# Patient Record
Sex: MALE | Race: WHITE | NOT HISPANIC OR LATINO
[De-identification: names, ages, dates, MRNs, and addresses within clinical notes are randomized per-mention and may not be internally consistent; named-entity substitution may affect disease eponyms.]

---

## 2017-01-24 ENCOUNTER — APPOINTMENT (OUTPATIENT)
Dept: ELECTROPHYSIOLOGY | Facility: CLINIC | Age: 67
End: 2017-01-24

## 2017-01-30 ENCOUNTER — APPOINTMENT (OUTPATIENT)
Dept: ELECTROPHYSIOLOGY | Facility: CLINIC | Age: 67
End: 2017-01-30

## 2017-03-03 ENCOUNTER — APPOINTMENT (OUTPATIENT)
Dept: ELECTROPHYSIOLOGY | Facility: CLINIC | Age: 67
End: 2017-03-03

## 2017-04-03 ENCOUNTER — APPOINTMENT (OUTPATIENT)
Dept: ELECTROPHYSIOLOGY | Facility: CLINIC | Age: 67
End: 2017-04-03

## 2017-05-05 ENCOUNTER — APPOINTMENT (OUTPATIENT)
Dept: ELECTROPHYSIOLOGY | Facility: CLINIC | Age: 67
End: 2017-05-05

## 2017-06-27 ENCOUNTER — APPOINTMENT (OUTPATIENT)
Dept: GASTROENTEROLOGY | Facility: CLINIC | Age: 67
End: 2017-06-27

## 2017-06-27 VITALS
DIASTOLIC BLOOD PRESSURE: 60 MMHG | HEART RATE: 65 BPM | HEIGHT: 71 IN | WEIGHT: 285 LBS | SYSTOLIC BLOOD PRESSURE: 107 MMHG | BODY MASS INDEX: 39.9 KG/M2 | OXYGEN SATURATION: 98 %

## 2017-06-27 DIAGNOSIS — Z80.0 FAMILY HISTORY OF MALIGNANT NEOPLASM OF DIGESTIVE ORGANS: ICD-10-CM

## 2017-06-27 DIAGNOSIS — K57.93 DIVERTICULITIS OF INTESTINE, PART UNSPECIFIED, W/OUT PERFORATION OR ABSCESS WITH BLEEDING: ICD-10-CM

## 2017-06-27 DIAGNOSIS — K57.92 DIVERTICULITIS OF INTESTINE, PART UNSPECIFIED, W/OUT PERFORATION OR ABSCESS W/OUT BLEEDING: ICD-10-CM

## 2017-06-27 RX ORDER — MOXIFLOXACIN HYDROCHLORIDE TABLETS, 400 MG 400 MG/1
400 TABLET, FILM COATED ORAL DAILY
Qty: 10 | Refills: 0 | Status: ACTIVE | COMMUNITY
Start: 2017-06-27 | End: 1900-01-01

## 2017-06-27 RX ORDER — ATORVASTATIN CALCIUM 80 MG/1
80 TABLET, FILM COATED ORAL
Qty: 30 | Refills: 0 | Status: ACTIVE | COMMUNITY
Start: 2017-06-06

## 2017-06-27 RX ORDER — VALSARTAN 40 MG/1
40 TABLET, COATED ORAL
Qty: 60 | Refills: 0 | Status: ACTIVE | COMMUNITY
Start: 2017-06-22

## 2017-06-27 RX ORDER — SERTRALINE HYDROCHLORIDE 100 MG/1
100 TABLET, FILM COATED ORAL
Qty: 180 | Refills: 0 | Status: ACTIVE | COMMUNITY
Start: 2017-06-22

## 2018-02-06 ENCOUNTER — RX RENEWAL (OUTPATIENT)
Age: 68
End: 2018-02-06

## 2018-02-06 RX ORDER — SODIUM SULFATE, POTASSIUM SULFATE, MAGNESIUM SULFATE 17.5; 3.13; 1.6 G/ML; G/ML; G/ML
17.5-3.13-1.6 SOLUTION, CONCENTRATE ORAL
Qty: 1 | Refills: 0 | Status: ACTIVE | COMMUNITY
Start: 2018-02-06 | End: 1900-01-01

## 2018-03-13 ENCOUNTER — APPOINTMENT (OUTPATIENT)
Dept: GASTROENTEROLOGY | Facility: AMBULATORY MEDICAL SERVICES | Age: 68
End: 2018-03-13
Payer: MEDICARE

## 2018-03-13 PROCEDURE — 45380 COLONOSCOPY AND BIOPSY: CPT

## 2018-07-27 PROBLEM — Z80.0 FAMILY HISTORY OF COLON CANCER: Status: ACTIVE | Noted: 2017-06-27

## 2018-12-02 ENCOUNTER — TRANSCRIPTION ENCOUNTER (OUTPATIENT)
Age: 68
End: 2018-12-02

## 2020-02-28 ENCOUNTER — INPATIENT (INPATIENT)
Facility: HOSPITAL | Age: 70
LOS: 2 days | Discharge: HOME | DRG: 696 | End: 2020-03-02
Attending: INTERNAL MEDICINE | Admitting: INTERNAL MEDICINE
Payer: MEDICARE

## 2020-02-28 VITALS
SYSTOLIC BLOOD PRESSURE: 114 MMHG | HEART RATE: 64 BPM | OXYGEN SATURATION: 99 % | TEMPERATURE: 98 F | HEIGHT: 71 IN | WEIGHT: 279.99 LBS | DIASTOLIC BLOOD PRESSURE: 71 MMHG | RESPIRATION RATE: 18 BRPM

## 2020-02-28 LAB
ALBUMIN SERPL ELPH-MCNC: 3.8 G/DL — SIGNIFICANT CHANGE UP (ref 3.3–5)
ALP SERPL-CCNC: 107 U/L — SIGNIFICANT CHANGE UP (ref 30–120)
ALT FLD-CCNC: 30 U/L DA — SIGNIFICANT CHANGE UP (ref 10–60)
ANION GAP SERPL CALC-SCNC: 8 MMOL/L — SIGNIFICANT CHANGE UP (ref 5–17)
APTT BLD: 33.9 SEC — SIGNIFICANT CHANGE UP (ref 28.5–37)
AST SERPL-CCNC: 21 U/L — SIGNIFICANT CHANGE UP (ref 10–40)
BASOPHILS # BLD AUTO: 0.04 K/UL — SIGNIFICANT CHANGE UP (ref 0–0.2)
BASOPHILS NFR BLD AUTO: 0.4 % — SIGNIFICANT CHANGE UP (ref 0–2)
BILIRUB SERPL-MCNC: 0.6 MG/DL — SIGNIFICANT CHANGE UP (ref 0.2–1.2)
BUN SERPL-MCNC: 19 MG/DL — SIGNIFICANT CHANGE UP (ref 7–23)
CALCIUM SERPL-MCNC: 8.8 MG/DL — SIGNIFICANT CHANGE UP (ref 8.4–10.5)
CHLORIDE SERPL-SCNC: 105 MMOL/L — SIGNIFICANT CHANGE UP (ref 96–108)
CO2 SERPL-SCNC: 27 MMOL/L — SIGNIFICANT CHANGE UP (ref 22–31)
CREAT SERPL-MCNC: 1.21 MG/DL — SIGNIFICANT CHANGE UP (ref 0.5–1.3)
EOSINOPHIL # BLD AUTO: 0.05 K/UL — SIGNIFICANT CHANGE UP (ref 0–0.5)
EOSINOPHIL NFR BLD AUTO: 0.4 % — SIGNIFICANT CHANGE UP (ref 0–6)
GLUCOSE SERPL-MCNC: 120 MG/DL — HIGH (ref 70–99)
HCT VFR BLD CALC: 44 % — SIGNIFICANT CHANGE UP (ref 39–50)
HGB BLD-MCNC: 14.9 G/DL — SIGNIFICANT CHANGE UP (ref 13–17)
IMM GRANULOCYTES NFR BLD AUTO: 0.4 % — SIGNIFICANT CHANGE UP (ref 0–1.5)
INR BLD: 1.35 RATIO — HIGH (ref 0.88–1.16)
LYMPHOCYTES # BLD AUTO: 1.41 K/UL — SIGNIFICANT CHANGE UP (ref 1–3.3)
LYMPHOCYTES # BLD AUTO: 12.6 % — LOW (ref 13–44)
MCHC RBC-ENTMCNC: 30.8 PG — SIGNIFICANT CHANGE UP (ref 27–34)
MCHC RBC-ENTMCNC: 33.9 GM/DL — SIGNIFICANT CHANGE UP (ref 32–36)
MCV RBC AUTO: 91.1 FL — SIGNIFICANT CHANGE UP (ref 80–100)
MONOCYTES # BLD AUTO: 0.86 K/UL — SIGNIFICANT CHANGE UP (ref 0–0.9)
MONOCYTES NFR BLD AUTO: 7.7 % — SIGNIFICANT CHANGE UP (ref 2–14)
NEUTROPHILS # BLD AUTO: 8.75 K/UL — HIGH (ref 1.8–7.4)
NEUTROPHILS NFR BLD AUTO: 78.5 % — HIGH (ref 43–77)
NRBC # BLD: 0 /100 WBCS — SIGNIFICANT CHANGE UP (ref 0–0)
PLATELET # BLD AUTO: 186 K/UL — SIGNIFICANT CHANGE UP (ref 150–400)
POTASSIUM SERPL-MCNC: 4.4 MMOL/L — SIGNIFICANT CHANGE UP (ref 3.5–5.3)
POTASSIUM SERPL-SCNC: 4.4 MMOL/L — SIGNIFICANT CHANGE UP (ref 3.5–5.3)
PROT SERPL-MCNC: 7.9 G/DL — SIGNIFICANT CHANGE UP (ref 6–8.3)
PROTHROM AB SERPL-ACNC: 15.1 SEC — HIGH (ref 10–12.9)
RBC # BLD: 4.83 M/UL — SIGNIFICANT CHANGE UP (ref 4.2–5.8)
RBC # FLD: 13.2 % — SIGNIFICANT CHANGE UP (ref 10.3–14.5)
SODIUM SERPL-SCNC: 140 MMOL/L — SIGNIFICANT CHANGE UP (ref 135–145)
WBC # BLD: 11.15 K/UL — HIGH (ref 3.8–10.5)
WBC # FLD AUTO: 11.15 K/UL — HIGH (ref 3.8–10.5)

## 2020-02-28 RX ORDER — SODIUM CHLORIDE 9 MG/ML
1000 INJECTION INTRAMUSCULAR; INTRAVENOUS; SUBCUTANEOUS ONCE
Refills: 0 | Status: COMPLETED | OUTPATIENT
Start: 2020-02-28 | End: 2020-02-28

## 2020-02-28 RX ORDER — OXYCODONE HYDROCHLORIDE 5 MG/1
5 TABLET ORAL
Refills: 0 | Status: DISCONTINUED | OUTPATIENT
Start: 2020-02-28 | End: 2020-03-02

## 2020-02-28 RX ORDER — TAMSULOSIN HYDROCHLORIDE 0.4 MG/1
0.8 CAPSULE ORAL AT BEDTIME
Refills: 0 | Status: DISCONTINUED | OUTPATIENT
Start: 2020-02-28 | End: 2020-03-02

## 2020-02-28 RX ORDER — HYDROMORPHONE HYDROCHLORIDE 2 MG/ML
1 INJECTION INTRAMUSCULAR; INTRAVENOUS; SUBCUTANEOUS ONCE
Refills: 0 | Status: DISCONTINUED | OUTPATIENT
Start: 2020-02-28 | End: 2020-02-28

## 2020-02-28 RX ORDER — TRAMADOL HYDROCHLORIDE 50 MG/1
25 TABLET ORAL
Refills: 0 | Status: DISCONTINUED | OUTPATIENT
Start: 2020-02-28 | End: 2020-03-02

## 2020-02-28 RX ORDER — METOPROLOL TARTRATE 50 MG
100 TABLET ORAL DAILY
Refills: 0 | Status: DISCONTINUED | OUTPATIENT
Start: 2020-02-28 | End: 2020-03-02

## 2020-02-28 RX ORDER — ACETAMINOPHEN 500 MG
650 TABLET ORAL EVERY 6 HOURS
Refills: 0 | Status: DISCONTINUED | OUTPATIENT
Start: 2020-02-28 | End: 2020-03-02

## 2020-02-28 RX ORDER — ONDANSETRON 8 MG/1
4 TABLET, FILM COATED ORAL ONCE
Refills: 0 | Status: COMPLETED | OUTPATIENT
Start: 2020-02-28 | End: 2020-02-28

## 2020-02-28 RX ORDER — MORPHINE SULFATE 50 MG/1
4 CAPSULE, EXTENDED RELEASE ORAL ONCE
Refills: 0 | Status: DISCONTINUED | OUTPATIENT
Start: 2020-02-28 | End: 2020-02-28

## 2020-02-28 RX ORDER — FINASTERIDE 5 MG/1
5 TABLET, FILM COATED ORAL DAILY
Refills: 0 | Status: DISCONTINUED | OUTPATIENT
Start: 2020-02-28 | End: 2020-03-02

## 2020-02-28 RX ORDER — ATORVASTATIN CALCIUM 80 MG/1
80 TABLET, FILM COATED ORAL AT BEDTIME
Refills: 0 | Status: DISCONTINUED | OUTPATIENT
Start: 2020-02-28 | End: 2020-03-02

## 2020-02-28 RX ADMIN — MORPHINE SULFATE 4 MILLIGRAM(S): 50 CAPSULE, EXTENDED RELEASE ORAL at 22:19

## 2020-02-28 RX ADMIN — HYDROMORPHONE HYDROCHLORIDE 1 MILLIGRAM(S): 2 INJECTION INTRAMUSCULAR; INTRAVENOUS; SUBCUTANEOUS at 23:35

## 2020-02-28 RX ADMIN — SODIUM CHLORIDE 1000 MILLILITER(S): 9 INJECTION INTRAMUSCULAR; INTRAVENOUS; SUBCUTANEOUS at 22:19

## 2020-02-28 RX ADMIN — MORPHINE SULFATE 4 MILLIGRAM(S): 50 CAPSULE, EXTENDED RELEASE ORAL at 22:40

## 2020-02-28 RX ADMIN — ONDANSETRON 4 MILLIGRAM(S): 8 TABLET, FILM COATED ORAL at 23:35

## 2020-02-28 RX ADMIN — HYDROMORPHONE HYDROCHLORIDE 1 MILLIGRAM(S): 2 INJECTION INTRAMUSCULAR; INTRAVENOUS; SUBCUTANEOUS at 23:34

## 2020-02-28 NOTE — ED PROVIDER NOTE - CONSTITUTIONAL, MLM
normal... Well appearing, awake, alert, oriented to person, place, time/situation and appears uncomfortable.

## 2020-02-28 NOTE — ED PROVIDER NOTE - CARDIAC, MLM
Normal rate, irregular rhythm.  Heart sounds S1, S2.  No murmurs, rubs or gallops. cap refill <2 seconds

## 2020-02-28 NOTE — ED ADULT NURSE NOTE - OBJECTIVE STATEMENT
c/o hematuria, hx of atrial fibrillation and patient is on Xarelto, hx of turf x 2, Pt is in no acute distress but c/o bad spasm. c/o hematuria, hx of atrial fibrillation and patient is on blood thinner, hx of turf x 2, Pt is in no acute distress but c/o bad spasm. Pt is in no acute distress. patient appears anxious and uncomfortable, standing, wife at bedside, will continue to monitor.

## 2020-02-28 NOTE — ED PROVIDER NOTE - PROGRESS NOTE DETAILS
pt irrigated by 2 way catheter to pink with no clots.  will replace catheter with 3 way  catheter and admit for cbi and uology consult in am pt irrigated ,  three way replaced and cbi started requiring intermittent irrigation

## 2020-02-28 NOTE — ED PROVIDER NOTE - NOTES
d/w dr ozuna, suggests trying [address] two way for hand irrigation then when clearer, changing back for c/b/i

## 2020-02-28 NOTE — ED PROVIDER NOTE - OBJECTIVE STATEMENT
70 y/o male with a PMhx of CAD s/p stents, HLD, BPH presents to the ED c/o hematuria and urinary retention today. Pt states he has had hematuria in past with clots. Hx of turp procedure x2. On Eliquis for afib. Denies dizziness, SOB. 70 y/o male with a PMhx of CAD s/p stents, HLD, BPH presents to the ED c/o hematuria and urinary retention today. Pt states he has had hematuria in past with clots. Hx of turp procedure x2. On Eliquis for afib. Denies dizziness, SOB.    Pmd: deyanira patrick  uro: jacob at [facility]; dheeraj

## 2020-02-28 NOTE — ED PROVIDER NOTE - CLINICAL SUMMARY MEDICAL DECISION MAKING FREE TEXT BOX
70 y/o male hx of multiple prostate procedures on eliquis for afib p/w hematuria and clot retention ,check CBC, type and screen, coags, initiate cbi, re-eval. 70 y/o male hx of multiple prostate procedures on eliquis for afib p/w hematuria and clot retention ,check CBC, type and screen, coags, initiate cbi, re-eval.--admit cbi,  urol consult in am

## 2020-02-29 DIAGNOSIS — R31.9 HEMATURIA, UNSPECIFIED: ICD-10-CM

## 2020-02-29 DIAGNOSIS — I48.0 PAROXYSMAL ATRIAL FIBRILLATION: ICD-10-CM

## 2020-02-29 DIAGNOSIS — I25.10 ATHEROSCLEROTIC HEART DISEASE OF NATIVE CORONARY ARTERY WITHOUT ANGINA PECTORIS: ICD-10-CM

## 2020-02-29 DIAGNOSIS — N40.1 BENIGN PROSTATIC HYPERPLASIA WITH LOWER URINARY TRACT SYMPTOMS: ICD-10-CM

## 2020-02-29 DIAGNOSIS — Z29.9 ENCOUNTER FOR PROPHYLACTIC MEASURES, UNSPECIFIED: ICD-10-CM

## 2020-02-29 DIAGNOSIS — R31.0 GROSS HEMATURIA: ICD-10-CM

## 2020-02-29 DIAGNOSIS — E78.5 HYPERLIPIDEMIA, UNSPECIFIED: ICD-10-CM

## 2020-02-29 LAB
APPEARANCE UR: ABNORMAL
BILIRUB UR-MCNC: NEGATIVE — SIGNIFICANT CHANGE UP
BLD GP AB SCN SERPL QL: SIGNIFICANT CHANGE UP
COLOR SPEC: ABNORMAL
DIFF PNL FLD: ABNORMAL
GLUCOSE UR QL: NEGATIVE MG/DL — SIGNIFICANT CHANGE UP
HBA1C BLD-MCNC: 5.2 % — SIGNIFICANT CHANGE UP (ref 4–5.6)
HCT VFR BLD CALC: 39.6 % — SIGNIFICANT CHANGE UP (ref 39–50)
HGB BLD-MCNC: 13.8 G/DL — SIGNIFICANT CHANGE UP (ref 13–17)
KETONES UR-MCNC: NEGATIVE — SIGNIFICANT CHANGE UP
LEUKOCYTE ESTERASE UR-ACNC: ABNORMAL
MCHC RBC-ENTMCNC: 31.2 PG — SIGNIFICANT CHANGE UP (ref 27–34)
MCHC RBC-ENTMCNC: 34.8 GM/DL — SIGNIFICANT CHANGE UP (ref 32–36)
MCV RBC AUTO: 89.4 FL — SIGNIFICANT CHANGE UP (ref 80–100)
NITRITE UR-MCNC: NEGATIVE — SIGNIFICANT CHANGE UP
NRBC # BLD: 0 /100 WBCS — SIGNIFICANT CHANGE UP (ref 0–0)
PH UR: 5 — SIGNIFICANT CHANGE UP (ref 5–8)
PLATELET # BLD AUTO: 183 K/UL — SIGNIFICANT CHANGE UP (ref 150–400)
PROT UR-MCNC: 100 MG/DL
RBC # BLD: 4.43 M/UL — SIGNIFICANT CHANGE UP (ref 4.2–5.8)
RBC # FLD: 13.2 % — SIGNIFICANT CHANGE UP (ref 10.3–14.5)
RBC CASTS # UR COMP ASSIST: >50 /HPF (ref 0–4)
SP GR SPEC: 1 — LOW (ref 1.01–1.02)
UROBILINOGEN FLD QL: NEGATIVE MG/DL — SIGNIFICANT CHANGE UP
WBC # BLD: 11.56 K/UL — HIGH (ref 3.8–10.5)
WBC # FLD AUTO: 11.56 K/UL — HIGH (ref 3.8–10.5)
WBC UR QL: SIGNIFICANT CHANGE UP

## 2020-02-29 PROCEDURE — 99285 EMERGENCY DEPT VISIT HI MDM: CPT

## 2020-02-29 RX ORDER — SERTRALINE 25 MG/1
200 TABLET, FILM COATED ORAL DAILY
Refills: 0 | Status: DISCONTINUED | OUTPATIENT
Start: 2020-02-29 | End: 2020-03-02

## 2020-02-29 RX ORDER — METOPROLOL TARTRATE 50 MG
75 TABLET ORAL
Qty: 0 | Refills: 0 | DISCHARGE

## 2020-02-29 RX ORDER — CEFTRIAXONE 500 MG/1
1000 INJECTION, POWDER, FOR SOLUTION INTRAMUSCULAR; INTRAVENOUS ONCE
Refills: 0 | Status: COMPLETED | OUTPATIENT
Start: 2020-02-29 | End: 2020-02-29

## 2020-02-29 RX ORDER — ALPRAZOLAM 0.25 MG
0.25 TABLET ORAL ONCE
Refills: 0 | Status: DISCONTINUED | OUTPATIENT
Start: 2020-02-29 | End: 2020-02-29

## 2020-02-29 RX ADMIN — Medication 650 MILLIGRAM(S): at 09:50

## 2020-02-29 RX ADMIN — FINASTERIDE 5 MILLIGRAM(S): 5 TABLET, FILM COATED ORAL at 13:02

## 2020-02-29 RX ADMIN — FINASTERIDE 5 MILLIGRAM(S): 5 TABLET, FILM COATED ORAL at 00:25

## 2020-02-29 RX ADMIN — Medication 650 MILLIGRAM(S): at 09:08

## 2020-02-29 RX ADMIN — CEFTRIAXONE 100 MILLIGRAM(S): 500 INJECTION, POWDER, FOR SOLUTION INTRAMUSCULAR; INTRAVENOUS at 00:16

## 2020-02-29 RX ADMIN — SERTRALINE 200 MILLIGRAM(S): 25 TABLET, FILM COATED ORAL at 17:28

## 2020-02-29 RX ADMIN — Medication 0.25 MILLIGRAM(S): at 23:00

## 2020-02-29 RX ADMIN — ATORVASTATIN CALCIUM 80 MILLIGRAM(S): 80 TABLET, FILM COATED ORAL at 21:35

## 2020-02-29 RX ADMIN — TAMSULOSIN HYDROCHLORIDE 0.8 MILLIGRAM(S): 0.4 CAPSULE ORAL at 00:25

## 2020-02-29 RX ADMIN — ATORVASTATIN CALCIUM 80 MILLIGRAM(S): 80 TABLET, FILM COATED ORAL at 00:25

## 2020-02-29 RX ADMIN — Medication 100 MILLIGRAM(S): at 00:25

## 2020-02-29 RX ADMIN — TAMSULOSIN HYDROCHLORIDE 0.8 MILLIGRAM(S): 0.4 CAPSULE ORAL at 21:36

## 2020-02-29 RX ADMIN — SODIUM CHLORIDE 1000 MILLILITER(S): 9 INJECTION INTRAMUSCULAR; INTRAVENOUS; SUBCUTANEOUS at 00:00

## 2020-02-29 RX ADMIN — CEFTRIAXONE 1000 MILLIGRAM(S): 500 INJECTION, POWDER, FOR SOLUTION INTRAMUSCULAR; INTRAVENOUS at 01:16

## 2020-02-29 NOTE — CONSULT NOTE ADULT - SUBJECTIVE AND OBJECTIVE BOX
CHIEF COMPLAINT:    HPI:      PAST MEDICAL & SURGICAL HISTORY:  Afib  Hyperlipemia  BPH (benign prostatic hyperplasia)  CAD (coronary artery disease)  Stented coronary artery      REVIEW OF SYSTEMS:    CONSTITUTIONAL: No weakness, fevers or chills  EYES/ENT: No visual changes;  No vertigo or throat pain   NECK: No pain or stiffness  RESPIRATORY: No cough, wheezing, hemoptysis; No shortness of breath  CARDIOVASCULAR: No chest pain or palpitations  GASTROINTESTINAL: No abdominal or epigastric pain. No nausea, vomiting, or hematemesis; No diarrhea or constipation. No melena or hematochezia.  GENITOURINARY: No dysuria, frequency or hematuria  NEUROLOGICAL: No numbness or weakness  SKIN: No itching, burning, rashes, or lesions   All other review of systems is negative unless indicated above.    MEDICATIONS  (STANDING):  atorvastatin 80 milliGRAM(s) Oral at bedtime  finasteride 5 milliGRAM(s) Oral daily  metoprolol succinate  milliGRAM(s) Oral daily  tamsulosin 0.8 milliGRAM(s) Oral at bedtime    MEDICATIONS  (PRN):  acetaminophen   Tablet .. 650 milliGRAM(s) Oral every 6 hours PRN Mild Pain (1 - 3)  oxyCODONE    IR 5 milliGRAM(s) Oral four times a day PRN Severe Pain (7 - 10)  traMADol 25 milliGRAM(s) Oral four times a day PRN Moderate Pain (4 - 6)      Allergies    penicillin (Rash)    Intolerances        Social History:  Alcohol: Denied  Smoking: Nonsmoker  Drug Use: Denied  Marital Status:     FAMILY HISTORY:  No pertinent family history in first degree relatives      Vital Signs Last 24 Hrs  T(C): 36.5 (29 Feb 2020 09:30), Max: 36.7 (29 Feb 2020 00:39)  T(F): 97.7 (29 Feb 2020 09:30), Max: 98.1 (29 Feb 2020 04:19)  HR: 73 (29 Feb 2020 09:30) (61 - 78)  BP: 123/80 (29 Feb 2020 09:30) (101/61 - 148/84)  BP(mean): --  RR: 16 (29 Feb 2020 09:30) (16 - 20)  SpO2: 96% (29 Feb 2020 09:30) (95% - 99%)    PHYSICAL EXAM:    Constitutional: NAD, well-developed  HEENT: LATOYA, EOMI, Normal Hearing, MMM  Neck: No LAD, No JVD  Back: Normal spine flexure, No CVA tenderness  Respiratory: CTAB   Cardiovascular: S1 and S2, RRR, no M/G/R  Abd: BS+, soft, NT/ND, No CVAT  : Normal phallus,open meatus,bilateral descended testes, no masses  ZEN: Normal prostate, no masses  Extremities: No peripheral edema  Vascular: 2+ peripheral pulses  Neurological: A/O x 3, no focal deficits  Psychiatric: Normal mood, normal affect  Musculoskeletal: 5/5 strength b/l upper and lower extremities  Skin: No rashes    LABS:                        13.8   11.56 )-----------( 183      ( 29 Feb 2020 00:32 )             39.6     02-28    140  |  105  |  19  ----------------------------<  120<H>  4.4   |  27  |  1.21    Ca    8.8      28 Feb 2020 21:45    TPro  7.9  /  Alb  3.8  /  TBili  0.6  /  DBili  x   /  AST  21  /  ALT  30  /  AlkPhos  107  02-28    PT/INR - ( 28 Feb 2020 21:45 )   PT: 15.1 sec;   INR: 1.35 ratio         PTT - ( 28 Feb 2020 21:45 )  PTT:33.9 sec  Urinalysis Basic - ( 29 Feb 2020 00:35 )    Color: x / Appearance: x / SG: x / pH: x  Gluc: x / Ketone: x  / Bili: x / Urobili: x   Blood: x / Protein: x / Nitrite: x   Leuk Esterase: x / RBC: >50 /HPF / WBC 3-5   Sq Epi: x / Non Sq Epi: x / Bacteria: x      Urine Culture:   Blood Cultures      RADIOLOGY & ADDITIONAL STUDIES: CHIEF COMPLAINT: TGH/UR    HPI: 69-year-old male with past medical history of hypertension, CAD, status post stent placement, atrial fibrillation, status post ablation, hyperlipidemia, BPH, status post GL PVP x 2 - '13 & '14, who came to the emergency room complaining of Blood in the urine, passing clots and urinary retention.  Patient apparently had similar episodes in the past which required continuous bladder irrigation.  Patient complain of some lower abdominal discomfort and spasm.  Patient in the emergency room had Lainez catheter placed and his clots were flushed.  He was placed on CBI and is admitted to the hospital for further management. This morning urine light domenic w/ mod CBI.      PAST MEDICAL & SURGICAL HISTORY:  Afib  Hyperlipemia  BPH (benign prostatic hyperplasia)  CAD (coronary artery disease)  Stented coronary artery      REVIEW OF SYSTEMS:    CONSTITUTIONAL: No weakness, fevers or chills  EYES/ENT: No visual changes;  No vertigo or throat pain   NECK: No pain or stiffness  RESPIRATORY: No cough, wheezing, hemoptysis; No shortness of breath  CARDIOVASCULAR: No chest pain or palpitations  GASTROINTESTINAL: No abdominal or epigastric pain. No nausea, vomiting, or hematemesis; No diarrhea or constipation. No melena or hematochezia.  GENITOURINARY: No dysuria, frequency   NEUROLOGICAL: No numbness or weakness  SKIN: No itching, burning, rashes, or lesions   All other review of systems is negative unless indicated above.    MEDICATIONS  (STANDING):  atorvastatin 80 milliGRAM(s) Oral at bedtime  finasteride 5 milliGRAM(s) Oral daily  metoprolol succinate  milliGRAM(s) Oral daily  tamsulosin 0.8 milliGRAM(s) Oral at bedtime    MEDICATIONS  (PRN):  acetaminophen   Tablet .. 650 milliGRAM(s) Oral every 6 hours PRN Mild Pain (1 - 3)  oxyCODONE    IR 5 milliGRAM(s) Oral four times a day PRN Severe Pain (7 - 10)  traMADol 25 milliGRAM(s) Oral four times a day PRN Moderate Pain (4 - 6)      Allergies    penicillin (Rash)    Intolerances        Social History:  Alcohol: Denied  Smoking: Nonsmoker  Drug Use: Denied  Marital Status:     FAMILY HISTORY:  No pertinent family history in first degree relatives      Vital Signs Last 24 Hrs  T(C): 36.5 (29 Feb 2020 09:30), Max: 36.7 (29 Feb 2020 00:39)  T(F): 97.7 (29 Feb 2020 09:30), Max: 98.1 (29 Feb 2020 04:19)  HR: 73 (29 Feb 2020 09:30) (61 - 78)  BP: 123/80 (29 Feb 2020 09:30) (101/61 - 148/84)  BP(mean): --  RR: 16 (29 Feb 2020 09:30) (16 - 20)  SpO2: 96% (29 Feb 2020 09:30) (95% - 99%)    PHYSICAL EXAM:    Constitutional: NAD, well-developed  HEENT: LATOYA, EOMI, Normal Hearing  Neck: No LAD  Back: Normal spine flexure, No CVA tenderness  Respiratory: Nl resp effort  Cardiovascular: RRR  Abd: BS+, soft, NT/ND  : Normal phallus,open meatus,bilateral descended testes, no masses  Lainez 3 way - light domenic w/ mod cbi, few small clots  Extremities: No peripheral edema  Vascular: 2+ peripheral pulses  Neurological: A/O x 3, no focal deficits  Psychiatric: Normal mood, normal affect  Musculoskeletal: 5/5 strength b/l upper and lower extremities  Skin: No rashes    LABS:                        13.8   11.56 )-----------( 183      ( 29 Feb 2020 00:32 )             39.6     02-28    140  |  105  |  19  ----------------------------<  120<H>  4.4   |  27  |  1.21    Ca    8.8      28 Feb 2020 21:45    TPro  7.9  /  Alb  3.8  /  TBili  0.6  /  DBili  x   /  AST  21  /  ALT  30  /  AlkPhos  107  02-28    PT/INR - ( 28 Feb 2020 21:45 )   PT: 15.1 sec;   INR: 1.35 ratio         PTT - ( 28 Feb 2020 21:45 )  PTT:33.9 sec  Urinalysis Basic - ( 29 Feb 2020 00:35 )    Color: x / Appearance: x / SG: x / pH: x  Gluc: x / Ketone: x  / Bili: x / Urobili: x   Blood: x / Protein: x / Nitrite: x   Leuk Esterase: x / RBC: >50 /HPF / WBC 3-5   Sq Epi: x / Non Sq Epi: x / Bacteria: x      Urine Culture:   Blood Cultures      RADIOLOGY & ADDITIONAL STUDIES:

## 2020-02-29 NOTE — H&P ADULT - NSHPREVIEWOFSYSTEMS_GEN_ALL_CORE
REVIEW OF SYSTEMS:  CONSTITUTIONAL: No fever, weight loss, or fatigue  EYES: No eye pain, or discharge  ENMT: No sinus or throat pain  NECK: No pain or stiffness  BREASTS: No pain, masses, or nipple discharge  RESPIRATORY: No cough, wheezing, chills or hemoptysis; No shortness of breath  CARDIOVASCULAR: No chest pain, palpitations, dizziness, or leg swelling  GASTROINTESTINAL: No nausea, vomiting. + +lower abdominal pain.   GENITOURINARY: + hematuria  NEUROLOGICAL: No loss of strength, numbness, or tremors  SKIN: No itching, burning, rashes, or lesions   LYMPH NODES: No enlarged glands  ENDOCRINE: No polydipsia or polyuria  MUSCULOSKELETAL: No muscle, back, or extremity pain  PSYCHIATRIC: No depression, anxiety, mood swings.  HEME/LYMPH: No easy bruising, or bleeding gums  ALLERGY AND IMMUNOLOGIC: No hives or eczema

## 2020-02-29 NOTE — H&P ADULT - NSHPLABSRESULTS_GEN_ALL_CORE
13.8   11.56 )-----------( 183      ( 29 Feb 2020 00:32 )             39.6     28 Feb 2020 21:45    140    |  105    |  19     ----------------------------<  120    4.4     |  27     |  1.21     Ca    8.8        28 Feb 2020 21:45    TPro  7.9    /  Alb  3.8    /  TBili  0.6    /  DBili  x      /  AST  21     /  ALT  30     /  AlkPhos  107    28 Feb 2020 21:45    PT/INR - ( 28 Feb 2020 21:45 )   PT: 15.1 sec;   INR: 1.35 ratio         PTT - ( 28 Feb 2020 21:45 )  PTT:33.9 sec

## 2020-02-29 NOTE — H&P ADULT - NSHPSOCIALHISTORY_GEN_ALL_CORE
Social History:    Marital Status:   Occupation:   Lives with:     Substance Use :  Tobacco Usage:  (   ) never smoked   (   ) former smoker   (   ) current smoker  (     ) pack year  (        ) last tobacco use date  Alcohol Usage:    (     ) Advanced Directives: (     ) declined   [  ] health care proxy Social History:    Marital Status:   Lives with: spouse    Substance Use : Denies  Tobacco Usage:  (X) never smoked   (   ) former smoker   (   ) current smoker  (     ) pack year  (        ) last tobacco use date  Alcohol Usage: Denies    (X) Advanced Directives: (X) declined   [  ] health care proxy Yes

## 2020-02-29 NOTE — H&P ADULT - PROBLEM SELECTOR PLAN 2
Patient is s/p ablation in past.   Will continue Metoprolol for rate control.   Hold Eliquis due to ongoing bleeding.

## 2020-02-29 NOTE — H&P ADULT - HISTORY OF PRESENT ILLNESS
69-year-old male with past medical history of hypertension, CAD, status post stent placement, atrial fibrillation, status post ablation, hyperlipidemia, BPH, status post TURPx2, who came to the emergency room complaining of Blood in the urine, passing clots and urinary retention.  Patient apparently had similar episodes in the past which required continuous bladder irrigation.  Patient complain of some lower abdominal discomfort and spasm.  Patient in the emergency room had Lainez catheter placed and his clots were flushed.  He was placed on CBI and is admitted to the hospital for further management.    Patient denies any chest pain or chest pressure.  He denies any fevers or chills.  He denies any dizziness or syncope.  He denies any nausea or vomiting.

## 2020-02-29 NOTE — H&P ADULT - PROBLEM SELECTOR PLAN 1
Patient with hematuria, POA.   Etiology not clear.   Continue CBI and Prn hand irrigation of clots as per urology.   Will hold ASA and Eliquis for now due to ongoing hematuria.   Follow culture data.   Further management as per patient's clinical course.

## 2020-02-29 NOTE — H&P ADULT - NSICDXPASTMEDICALHX_GEN_ALL_CORE_FT
PAST MEDICAL HISTORY:  Afib S/P Ablation in past.    BPH (benign prostatic hyperplasia) S/P TURPx 2 in past.    CAD (coronary artery disease) s/p stent placment.    Hyperlipemia     Hyperlipemia

## 2020-02-29 NOTE — CONSULT NOTE ADULT - ASSESSMENT
BPH s/p GL PVP x 2 - '13, '14 - on eliquis/ASA for h/o afib - ?resolved post ablation 1 yr ago.      Cont bowser to CBI; hand irrigate clots prn  Hold eliquis/ASA if OK w/ cardiology  Ucx/cytology BPH s/p GL PVP x 2 - '13, '14 - on eliquis/ASA for h/o afib - ?resolved post ablation 1 yr ago, ASHD, s/p stent      Cont bowser to CBI; hand irrigate clots prn  Hold eliquis/ASA if OK w/ cardiology  Ucx/cytology

## 2020-02-29 NOTE — H&P ADULT - ASSESSMENT
69-year-old male with past medical history of hypertension, CAD, status post stent placement, atrial fibrillation, status post ablation, hyperlipidemia, BPH, status post TURPx2, who came to the emergency room complaining of Blood in the urine, passing clots and urinary retention.  Patient apparently had similar episodes in the past which required continuous bladder irrigation.  Patient complain of some lower abdominal discomfort and spasm.  Patient in the emergency room had Lainez catheter placed and his clots were flushed.  He was placed on CBI and is admitted to the hospital for further management.

## 2020-02-29 NOTE — H&P ADULT - NSHPPHYSICALEXAM_GEN_ALL_CORE
Vital Signs Last 24 Hrs  T(C): 36.5 (29 Feb 2020 09:30), Max: 36.7 (29 Feb 2020 00:39)  T(F): 97.7 (29 Feb 2020 09:30), Max: 98.1 (29 Feb 2020 04:19)  HR: 73 (29 Feb 2020 09:30) (61 - 78)  BP: 123/80 (29 Feb 2020 09:30) (101/61 - 148/84)  BP(mean): --  RR: 16 (29 Feb 2020 09:30) (16 - 20)  SpO2: 96% (29 Feb 2020 09:30) (95% - 99%) Vital Signs Last 24 Hrs  T(C): 36.5 (29 Feb 2020 09:30), Max: 36.7 (29 Feb 2020 00:39)  T(F): 97.7 (29 Feb 2020 09:30), Max: 98.1 (29 Feb 2020 04:19)  HR: 73 (29 Feb 2020 09:30) (61 - 78)  BP: 123/80 (29 Feb 2020 09:30) (101/61 - 148/84)  BP(mean): --  RR: 16 (29 Feb 2020 09:30) (16 - 20)  SpO2: 96% (29 Feb 2020 09:30) (95% - 99%)    PHYSICAL EXAM:  GENERAL: NAD, well-groomed, well-developed  HEAD:  Atraumatic, Normocephalic  EYES: EOMI, PERRLA, conjunctiva and sclera clear  ENMT: Moist mucous membranes, no lesions  NECK: Supple.  CHEST/LUNG: Clear to auscultation bilaterally; No rales, rhonchi, wheezing, or rubs  HEART: S1, S2.   ABDOMEN: Soft, Nontender, Nondistended; Bowel sounds present  : Lainez +, on CBI, hematuria +  EXTREMITIES:  2+ Peripheral Pulses, No clubbing, cyanosis, or edema  MS: No joint swelling or deformity.   LYMPH: No lymphadenopathy noted  SKIN: No rashes or lesions  NERVOUS SYSTEM:  No focal deficit.   PSYCH:  Awake and alert.

## 2020-03-01 LAB
ANION GAP SERPL CALC-SCNC: 5 MMOL/L — SIGNIFICANT CHANGE UP (ref 5–17)
BUN SERPL-MCNC: 11 MG/DL — SIGNIFICANT CHANGE UP (ref 7–23)
CALCIUM SERPL-MCNC: 8.9 MG/DL — SIGNIFICANT CHANGE UP (ref 8.4–10.5)
CHLORIDE SERPL-SCNC: 107 MMOL/L — SIGNIFICANT CHANGE UP (ref 96–108)
CO2 SERPL-SCNC: 29 MMOL/L — SIGNIFICANT CHANGE UP (ref 22–31)
CREAT SERPL-MCNC: 1.09 MG/DL — SIGNIFICANT CHANGE UP (ref 0.5–1.3)
GLUCOSE SERPL-MCNC: 110 MG/DL — HIGH (ref 70–99)
HCT VFR BLD CALC: 38.5 % — LOW (ref 39–50)
HCV AB S/CO SERPL IA: 0.26 S/CO — SIGNIFICANT CHANGE UP (ref 0–0.99)
HCV AB SERPL-IMP: SIGNIFICANT CHANGE UP
HGB BLD-MCNC: 13.1 G/DL — SIGNIFICANT CHANGE UP (ref 13–17)
MCHC RBC-ENTMCNC: 31.1 PG — SIGNIFICANT CHANGE UP (ref 27–34)
MCHC RBC-ENTMCNC: 34 GM/DL — SIGNIFICANT CHANGE UP (ref 32–36)
MCV RBC AUTO: 91.4 FL — SIGNIFICANT CHANGE UP (ref 80–100)
NRBC # BLD: 0 /100 WBCS — SIGNIFICANT CHANGE UP (ref 0–0)
PLATELET # BLD AUTO: 146 K/UL — LOW (ref 150–400)
POTASSIUM SERPL-MCNC: 3.9 MMOL/L — SIGNIFICANT CHANGE UP (ref 3.5–5.3)
POTASSIUM SERPL-SCNC: 3.9 MMOL/L — SIGNIFICANT CHANGE UP (ref 3.5–5.3)
PROCALCITONIN SERPL-MCNC: 0.03 NG/ML — SIGNIFICANT CHANGE UP (ref 0.02–0.1)
RBC # BLD: 4.21 M/UL — SIGNIFICANT CHANGE UP (ref 4.2–5.8)
RBC # FLD: 13.3 % — SIGNIFICANT CHANGE UP (ref 10.3–14.5)
SODIUM SERPL-SCNC: 141 MMOL/L — SIGNIFICANT CHANGE UP (ref 135–145)
WBC # BLD: 6.22 K/UL — SIGNIFICANT CHANGE UP (ref 3.8–10.5)
WBC # FLD AUTO: 6.22 K/UL — SIGNIFICANT CHANGE UP (ref 3.8–10.5)

## 2020-03-01 RX ORDER — MAGNESIUM HYDROXIDE 400 MG/1
30 TABLET, CHEWABLE ORAL
Refills: 0 | Status: DISCONTINUED | OUTPATIENT
Start: 2020-03-01 | End: 2020-03-02

## 2020-03-01 RX ORDER — OXYBUTYNIN CHLORIDE 5 MG
10 TABLET ORAL ONCE
Refills: 0 | Status: DISCONTINUED | OUTPATIENT
Start: 2020-03-01 | End: 2020-03-01

## 2020-03-01 RX ORDER — SODIUM CHLORIDE 9 MG/ML
1000 INJECTION, SOLUTION INTRAVENOUS
Refills: 0 | Status: DISCONTINUED | OUTPATIENT
Start: 2020-03-01 | End: 2020-03-01

## 2020-03-01 RX ORDER — ALPRAZOLAM 0.25 MG
0.5 TABLET ORAL EVERY 8 HOURS
Refills: 0 | Status: DISCONTINUED | OUTPATIENT
Start: 2020-03-01 | End: 2020-03-02

## 2020-03-01 RX ORDER — OXYBUTYNIN CHLORIDE 5 MG
5 TABLET ORAL ONCE
Refills: 0 | Status: COMPLETED | OUTPATIENT
Start: 2020-03-01 | End: 2020-03-01

## 2020-03-01 RX ORDER — SODIUM CHLORIDE 9 MG/ML
1000 INJECTION, SOLUTION INTRAVENOUS
Refills: 0 | Status: DISCONTINUED | OUTPATIENT
Start: 2020-03-01 | End: 2020-03-02

## 2020-03-01 RX ORDER — POLYETHYLENE GLYCOL 3350 17 G/17G
17 POWDER, FOR SOLUTION ORAL DAILY
Refills: 0 | Status: DISCONTINUED | OUTPATIENT
Start: 2020-03-01 | End: 2020-03-02

## 2020-03-01 RX ADMIN — MAGNESIUM HYDROXIDE 30 MILLILITER(S): 400 TABLET, CHEWABLE ORAL at 17:48

## 2020-03-01 RX ADMIN — Medication 0.5 MILLIGRAM(S): at 19:56

## 2020-03-01 RX ADMIN — POLYETHYLENE GLYCOL 3350 17 GRAM(S): 17 POWDER, FOR SOLUTION ORAL at 13:47

## 2020-03-01 RX ADMIN — Medication 5 MILLIGRAM(S): at 00:49

## 2020-03-01 RX ADMIN — OXYCODONE HYDROCHLORIDE 5 MILLIGRAM(S): 5 TABLET ORAL at 03:57

## 2020-03-01 RX ADMIN — TAMSULOSIN HYDROCHLORIDE 0.8 MILLIGRAM(S): 0.4 CAPSULE ORAL at 21:28

## 2020-03-01 RX ADMIN — Medication 100 MILLIGRAM(S): at 11:56

## 2020-03-01 RX ADMIN — SERTRALINE 200 MILLIGRAM(S): 25 TABLET, FILM COATED ORAL at 13:42

## 2020-03-01 RX ADMIN — FINASTERIDE 5 MILLIGRAM(S): 5 TABLET, FILM COATED ORAL at 11:56

## 2020-03-01 RX ADMIN — SODIUM CHLORIDE 200 MILLILITER(S): 9 INJECTION, SOLUTION INTRAVENOUS at 00:49

## 2020-03-01 RX ADMIN — OXYCODONE HYDROCHLORIDE 5 MILLIGRAM(S): 5 TABLET ORAL at 04:30

## 2020-03-01 RX ADMIN — ATORVASTATIN CALCIUM 80 MILLIGRAM(S): 80 TABLET, FILM COATED ORAL at 21:28

## 2020-03-01 NOTE — CHART NOTE - NSCHARTNOTEFT_GEN_A_CORE
Called because patient is complaining of bladder spasms.  Patient is on TBI for hematuria.  Was admitted today.  Had no issues earlier but now is having constant bladder spasms with the irrigation.  Patient is still having hematuria.  Dr. Walden was called by the RN and suggests trying oxybutynin but the bowser catheter must remain in (high risk for clots).  Went to explain to the patient wife about TBI and necessity of the catheter.  They wanted to try a trial of IVF and oxybutynin and in a couple of hours to try the TBI again.  Will re-assess again throughout the night.

## 2020-03-01 NOTE — PROGRESS NOTE ADULT - PROVIDER SPECIALTY LIST ADULT
Urology Beta-Lactamase Value in next row Sensitive       <=4 SUSCEPTIBLECefazolin sensitivity results can be used to predict the effectiveness of oral cephalosporins (eg. Cephalexin) in uncomplicated Urinary Tract Infections due to E. coli, K. pneumoniae, and P. mirabilis     gentamicin Value in next row Sensitive       <=4 SUSCEPTIBLECefazolin sensitivity results can be used to predict the effectiveness of oral cephalosporins (eg. Cephalexin) in uncomplicated Urinary Tract Infections due to E. coli, K. pneumoniae, and P. mirabilis     meropenem Value in next row        <=4 SUSCEPTIBLECefazolin sensitivity results can be used to predict the effectiveness of oral cephalosporins (eg. Cephalexin) in uncomplicated Urinary Tract Infections due to E. coli, K. pneumoniae, and P. mirabilis     nitrofurantoin Value in next row Sensitive       <=4 SUSCEPTIBLECefazolin sensitivity results can be used to predict the effectiveness of oral cephalosporins (eg. Cephalexin) in uncomplicated Urinary Tract Infections due to E. coli, K. pneumoniae, and P. mirabilis     tigecycline Value in next row        <=4 SUSCEPTIBLECefazolin sensitivity results can be used to predict the effectiveness of oral cephalosporins (eg. Cephalexin) in uncomplicated Urinary Tract Infections due to E. coli, K. pneumoniae, and P. mirabilis     tobramycin Value in next row Sensitive       <=4 SUSCEPTIBLECefazolin sensitivity results can be used to predict the effectiveness of oral cephalosporins (eg. Cephalexin) in uncomplicated Urinary Tract Infections due to E. coli, K. pneumoniae, and P. mirabilis     trimethoprim-sulfamethoxazole Value in next row Sensitive       <=4 SUSCEPTIBLECefazolin sensitivity results can be used to predict the effectiveness of oral cephalosporins (eg.  Cephalexin) in uncomplicated Urinary Tract Infections due to E. coli, K. pneumoniae, and P. 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

## 2020-03-01 NOTE — PROGRESS NOTE ADULT - SUBJECTIVE AND OBJECTIVE BOX
INTERVAL HPI/OVERNIGHT EVENTS:  Severe bladder spasm overnight; pt then refused cbi. Mult clots hand irrigated. Pt consented to restart cbi this morning.    MEDICATIONS  (STANDING):  atorvastatin 80 milliGRAM(s) Oral at bedtime  finasteride 5 milliGRAM(s) Oral daily  metoprolol succinate  milliGRAM(s) Oral daily  sertraline 200 milliGRAM(s) Oral daily  sodium chloride 0.45%. 1000 milliLiter(s) (75 mL/Hr) IV Continuous <Continuous>  tamsulosin 0.8 milliGRAM(s) Oral at bedtime    MEDICATIONS  (PRN):  acetaminophen   Tablet .. 650 milliGRAM(s) Oral every 6 hours PRN Mild Pain (1 - 3)  oxyCODONE    IR 5 milliGRAM(s) Oral four times a day PRN Severe Pain (7 - 10)  traMADol 25 milliGRAM(s) Oral four times a day PRN Moderate Pain (4 - 6)        Vital Signs Last 24 Hrs  T(C): 36.4 (01 Mar 2020 10:09), Max: 37.2 (29 Feb 2020 17:30)  T(F): 97.5 (01 Mar 2020 10:09), Max: 99 (29 Feb 2020 17:30)  HR: 94 (01 Mar 2020 10:09) (63 - 94)  BP: 111/68 (01 Mar 2020 10:09) (102/57 - 123/77)  BP(mean): --  RR: 18 (01 Mar 2020 10:09) (14 - 18)  SpO2: 94% (01 Mar 2020 10:09) (94% - 99%)    PHYSICAL EXAM:    ABDOMEN: benign  GENITALIA: foely - light domenic on cbi    LABS:                        13.1   6.22  )-----------( 146      ( 01 Mar 2020 08:15 )             38.5     03-01    141  |  107  |  11  ----------------------------<  110<H>  3.9   |  29  |  1.09    Ca    8.9      01 Mar 2020 08:15    TPro  7.9  /  Alb  3.8  /  TBili  0.6  /  DBili  x   /  AST  21  /  ALT  30  /  AlkPhos  107  02-28    PT/INR - ( 28 Feb 2020 21:45 )   PT: 15.1 sec;   INR: 1.35 ratio         PTT - ( 28 Feb 2020 21:45 )  PTT:33.9 sec  Urinalysis Basic - ( 29 Feb 2020 00:35 )    Color: x / Appearance: x / SG: x / pH: x  Gluc: x / Ketone: x  / Bili: x / Urobili: x   Blood: x / Protein: x / Nitrite: x   Leuk Esterase: x / RBC: >50 /HPF / WBC 3-5   Sq Epi: x / Non Sq Epi: x / Bacteria: x        Blood Cultures:    RADIOLOGY & ADDITIONAL TESTS:

## 2020-03-01 NOTE — PROGRESS NOTE ADULT - SUBJECTIVE AND OBJECTIVE BOX
Patient is a 69y old  Male who presents with a chief complaint of Heamturia (01 Mar 2020 13:02)    HPI: 69-year-old male with past medical history of hypertension, CAD, status post stent placement, atrial fibrillation, status post ablation, hyperlipidemia, BPH, status post TURPx2, who came to the emergency room complaining of Blood in the urine, passing clots and urinary retention.  Patient apparently had similar episodes in the past which required continuous bladder irrigation.  Patient complain of some lower abdominal discomfort and spasm.  Patient in the emergency room had Lainez catheter placed and his clots were flushed.  He was placed on CBI and is admitted to the hospital for further management.    Patient denies any chest pain or chest pressure.  He denies any fevers or chills.  He denies any dizziness or syncope.  He denies any nausea or vomiting. (29 Feb 2020 14:24)    INTERVAL HPI/OVERNIGHT EVENTS:  Chart reviewed, notes reviewed.   Patient seen and examined.  Being followed by following specialists.     Consultant(s) Notes Reviewed:  [X] Yes    Care Discussed with Consultants/Other Providers: [X] Yes    REVIEW OF SYSTEMS:  CONSTITUTIONAL: No fever, weight loss, or fatigue  EYES: No eye pain, or discharge  ENMT: No sinus or throat pain  NECK: No pain or stiffness  BREASTS: No pain, masses, or nipple discharge  RESPIRATORY: No cough, wheezing, chills or hemoptysis; No shortness of breath  CARDIOVASCULAR: No chest pain, palpitations, dizziness, or leg swelling  GASTROINTESTINAL: No abdominal or epigastric pain. No nausea, vomiting.  GENITOURINARY: No dysuria, frequency, hematuria, or incontinence  NEUROLOGICAL: No loss of strength, numbness, or tremors  SKIN: No itching, burning, rashes, or lesions   LYMPH NODES: No enlarged glands  ENDOCRINE: No polydipsia or polyuria  MUSCULOSKELETAL: No muscle, back, or extremity pain  PSYCHIATRIC: No depression, anxiety, mood swings.  HEME/LYMPH: No easy bruising, or bleeding gums  ALLERGY AND IMMUNOLOGIC: No hives or eczema    Allergies: penicillin (Rash)    Intolerances    Home Medications:  Aspirin Low Dose 81 mg oral delayed release tablet: 1 tab(s) orally once a day (28 Feb 2020 20:15)  Eliquis 5 mg oral tablet: 1 tab(s) orally 2 times a day (29 Feb 2020 00:22)  Flomax: 0.8 milligram(s) orally once a day (at bedtime) (28 Feb 2020 20:15)  Lipitor 80 mg oral tablet: 1 tab(s) orally once a day (28 Feb 2020 20:15)  Proscar 5 mg oral tablet: 1 tab(s) orally once a day (at bedtime) (28 Feb 2020 20:15)  Toprol-XL: 75 milligram(s) orally 2 times a day (29 Feb 2020 00:22)    MEDICATIONS  (STANDING):  atorvastatin 80 milliGRAM(s) Oral at bedtime  finasteride 5 milliGRAM(s) Oral daily  magnesium hydroxide Suspension 30 milliLiter(s) Oral two times a day  metoprolol succinate  milliGRAM(s) Oral daily  polyethylene glycol 3350 17 Gram(s) Oral daily  sertraline 200 milliGRAM(s) Oral daily  sodium chloride 0.45%. 1000 milliLiter(s) (75 mL/Hr) IV Continuous <Continuous>  tamsulosin 0.8 milliGRAM(s) Oral at bedtime    MEDICATIONS  (PRN):  acetaminophen   Tablet .. 650 milliGRAM(s) Oral every 6 hours PRN Mild Pain (1 - 3)  ALPRAZolam 0.5 milliGRAM(s) Oral every 8 hours PRN Anxiety  oxyCODONE    IR 5 milliGRAM(s) Oral four times a day PRN Severe Pain (7 - 10)  traMADol 25 milliGRAM(s) Oral four times a day PRN Moderate Pain (4 - 6)    Vital Signs Last 24 Hrs  T(C): 36.7 (01 Mar 2020 14:40), Max: 37.2 (29 Feb 2020 17:30)  T(F): 98.1 (01 Mar 2020 14:40), Max: 99 (29 Feb 2020 17:30)  HR: 66 (01 Mar 2020 14:40) (66 - 94)  BP: 105/70 (01 Mar 2020 14:40) (102/57 - 123/77)  BP(mean): --  RR: 18 (01 Mar 2020 14:40) (15 - 18)  SpO2: 94% (01 Mar 2020 14:40) (94% - 99%)    PHYSICAL EXAM:  GENERAL: NAD, well-groomed, well-developed  HEAD:  Atraumatic, Normocephalic  EYES: EOMI, PERRLA, conjunctiva and sclera clear  ENMT: Moist mucous membranes, no lesions  NECK: Supple.  CHEST/LUNG: Clear to auscultation bilaterally; No rales, rhonchi, wheezing, or rubs  HEART: S1, S2.   ABDOMEN: Soft, Nontender, Nondistended; Bowel sounds present  EXTREMITIES:  2+ Peripheral Pulses, No clubbing, cyanosis, or edema  MS: No joint swelling or deformity.   LYMPH: No lymphadenopathy noted  SKIN: No rashes or lesions  NERVOUS SYSTEM:  No focal deficit.   PSYCH:  Awake and alert.   LABS:                         13.1   6.22  )-----------( 146      ( 01 Mar 2020 08:15 )             38.5     01 Mar 2020 08:15    141    |  107    |  11     ----------------------------<  110    3.9     |  29     |  1.09     Ca    8.9        01 Mar 2020 08:15    TPro  7.9    /  Alb  3.8    /  TBili  0.6    /  DBili  x      /  AST  21     /  ALT  30     /  AlkPhos  107    28 Feb 2020 21:45    CAPILLARY BLOOD GLUCOSE        PT/INR - ( 28 Feb 2020 21:45 )   PT: 15.1 sec;   INR: 1.35 ratio         PTT - ( 28 Feb 2020 21:45 )  PTT:33.9 sec  02-29 [id number] 5.2    Procalcitonin, Serum: 0.03 ng/mL (02-29-20 @ 14:30)    Urinalysis Basic - ( 29 Feb 2020 00:35 )    Color: x / Appearance: x / SG: x / pH: x  Gluc: x / Ketone: x  / Bili: x / Urobili: x   Blood: x / Protein: x / Nitrite: x   Leuk Esterase: x / RBC: >50 /HPF / WBC 3-5   Sq Epi: x / Non Sq Epi: x / Bacteria: x    RADIOLOGY TEST: (IMAGES REVIEWED BY ME)    Imaging Personally Reviewed:  [X] YES      HEALTH ISSUES - PROBLEM Dx:  Prophylactic measure: Prophylactic measure  Hyperlipidemia, unspecified hyperlipidemia type: Hyperlipidemia, unspecified hyperlipidemia type  Benign prostatic hyperplasia with urinary retention: Benign prostatic hyperplasia with urinary retention  CAD (coronary artery disease): CAD (coronary artery disease)  Paroxysmal atrial fibrillation: Paroxysmal atrial fibrillation  Gross hematuria: Gross hematuria Patient is a 69y old  Male who presents with a chief complaint of Heamturia (01 Mar 2020 13:02)    HPI: 69-year-old male with past medical history of hypertension, CAD, status post stent placement, atrial fibrillation, status post ablation, hyperlipidemia, BPH, status post TURPx2, who came to the emergency room complaining of Blood in the urine, passing clots and urinary retention.  Patient apparently had similar episodes in the past which required continuous bladder irrigation.  Patient complain of some lower abdominal discomfort and spasm.  Patient in the emergency room had Lainez catheter placed and his clots were flushed.  He was placed on CBI and is admitted to the hospital for further management.    Patient denies any chest pain or chest pressure.  He denies any fevers or chills.  He denies any dizziness or syncope.  He denies any nausea or vomiting. (29 Feb 2020 14:24)    INTERVAL HPI/OVERNIGHT EVENTS:  Chart reviewed, notes reviewed.   Patient seen and examined.  Being followed by following specialists.     Consultant(s) Notes Reviewed:  [X] Yes    Care Discussed with Consultants/Other Providers: [X] Yes    03/01/2020 --> Events noted. Patient ent had increasing pain and bladder spasm last night. Refused CBI and had regular Lainez placed with hand irrigation. Now back on CBI since this morning.      REVIEW OF SYSTEMS:  CONSTITUTIONAL: No fever, weight loss, or fatigue  EYES: No eye pain, or discharge  ENMT: No sinus or throat pain  NECK: No pain or stiffness  BREASTS: No pain, masses, or nipple discharge  RESPIRATORY: No cough, wheezing, chills or hemoptysis; No shortness of breath  CARDIOVASCULAR: No chest pain, palpitations, dizziness, or leg swelling  GASTROINTESTINAL: No abdominal or epigastric pain. No nausea, vomiting.  GENITOURINARY: + hematuria, + bladder spasms.   NEUROLOGICAL: No loss of strength, numbness, or tremors  SKIN: No itching, burning, rashes, or lesions   LYMPH NODES: No enlarged glands  ENDOCRINE: No polydipsia or polyuria  MUSCULOSKELETAL: No muscle, back, or extremity pain  PSYCHIATRIC: No depression, anxiety, mood swings.  HEME/LYMPH: No easy bruising, or bleeding gums  ALLERGY AND IMMUNOLOGIC: No hives or eczema    Allergies: penicillin (Rash)    Intolerances    Home Medications:  Aspirin Low Dose 81 mg oral delayed release tablet: 1 tab(s) orally once a day (28 Feb 2020 20:15)  Eliquis 5 mg oral tablet: 1 tab(s) orally 2 times a day (29 Feb 2020 00:22)  Flomax: 0.8 milligram(s) orally once a day (at bedtime) (28 Feb 2020 20:15)  Lipitor 80 mg oral tablet: 1 tab(s) orally once a day (28 Feb 2020 20:15)  Proscar 5 mg oral tablet: 1 tab(s) orally once a day (at bedtime) (28 Feb 2020 20:15)  Toprol-XL: 75 milligram(s) orally 2 times a day (29 Feb 2020 00:22)    MEDICATIONS  (STANDING):  atorvastatin 80 milliGRAM(s) Oral at bedtime  finasteride 5 milliGRAM(s) Oral daily  magnesium hydroxide Suspension 30 milliLiter(s) Oral two times a day  metoprolol succinate  milliGRAM(s) Oral daily  polyethylene glycol 3350 17 Gram(s) Oral daily  sertraline 200 milliGRAM(s) Oral daily  sodium chloride 0.45%. 1000 milliLiter(s) (75 mL/Hr) IV Continuous <Continuous>  tamsulosin 0.8 milliGRAM(s) Oral at bedtime    MEDICATIONS  (PRN):  acetaminophen   Tablet .. 650 milliGRAM(s) Oral every 6 hours PRN Mild Pain (1 - 3)  ALPRAZolam 0.5 milliGRAM(s) Oral every 8 hours PRN Anxiety  oxyCODONE    IR 5 milliGRAM(s) Oral four times a day PRN Severe Pain (7 - 10)  traMADol 25 milliGRAM(s) Oral four times a day PRN Moderate Pain (4 - 6)    Vital Signs Last 24 Hrs  T(C): 36.7 (01 Mar 2020 14:40), Max: 37.2 (29 Feb 2020 17:30)  T(F): 98.1 (01 Mar 2020 14:40), Max: 99 (29 Feb 2020 17:30)  HR: 66 (01 Mar 2020 14:40) (66 - 94)  BP: 105/70 (01 Mar 2020 14:40) (102/57 - 123/77)  BP(mean): --  RR: 18 (01 Mar 2020 14:40) (15 - 18)  SpO2: 94% (01 Mar 2020 14:40) (94% - 99%)    PHYSICAL EXAM:  GENERAL: NAD, well-groomed, well-developed  HEAD:  Atraumatic, Normocephalic  EYES: EOMI, PERRLA, conjunctiva and sclera clear  ENMT: Moist mucous membranes, no lesions  NECK: Supple.  CHEST/LUNG: Clear to auscultation bilaterally; No rales, rhonchi, wheezing, or rubs  HEART: S1, S2.   ABDOMEN: Soft, Nontender, Nondistended; Bowel sounds present  EXTREMITIES:  2+ Peripheral Pulses, No clubbing, cyanosis, or edema  MS: No joint swelling or deformity.   LYMPH: No lymphadenopathy noted  SKIN: No rashes or lesions  NERVOUS SYSTEM:  No focal deficit.   PSYCH:  Awake and alert.   LABS:                         13.1   6.22  )-----------( 146      ( 01 Mar 2020 08:15 )             38.5     01 Mar 2020 08:15    141    |  107    |  11     ----------------------------<  110    3.9     |  29     |  1.09     Ca    8.9        01 Mar 2020 08:15    TPro  7.9    /  Alb  3.8    /  TBili  0.6    /  DBili  x      /  AST  21     /  ALT  30     /  AlkPhos  107    28 Feb 2020 21:45    CAPILLARY BLOOD GLUCOSE        PT/INR - ( 28 Feb 2020 21:45 )   PT: 15.1 sec;   INR: 1.35 ratio         PTT - ( 28 Feb 2020 21:45 )  PTT:33.9 sec  02-29 [id number] 5.2    Procalcitonin, Serum: 0.03 ng/mL (02-29-20 @ 14:30)    Urinalysis Basic - ( 29 Feb 2020 00:35 )    Color: x / Appearance: x / SG: x / pH: x  Gluc: x / Ketone: x  / Bili: x / Urobili: x   Blood: x / Protein: x / Nitrite: x   Leuk Esterase: x / RBC: >50 /HPF / WBC 3-5   Sq Epi: x / Non Sq Epi: x / Bacteria: x    RADIOLOGY TEST: (IMAGES REVIEWED BY ME)    Imaging Personally Reviewed:  [X] YES      HEALTH ISSUES - PROBLEM Dx:  Prophylactic measure: Prophylactic measure  Hyperlipidemia, unspecified hyperlipidemia type: Hyperlipidemia, unspecified hyperlipidemia type  Benign prostatic hyperplasia with urinary retention: Benign prostatic hyperplasia with urinary retention  CAD (coronary artery disease): CAD (coronary artery disease)  Paroxysmal atrial fibrillation: Paroxysmal atrial fibrillation  Gross hematuria: Gross hematuria

## 2020-03-01 NOTE — DIETITIAN INITIAL EVALUATION ADULT. - OTHER INFO
69-year-old male with past medical history of hypertension, CAD, status post stent placement, atrial fibrillation, status post ablation, hyperlipidemia, BPH, status post TURPx2, who came to the emergency room complaining of Blood in the urine, passing clots and urinary retention.  Patient apparently had similar episodes in the past which required continuous bladder irrigation.  Patient complain of some lower abdominal discomfort and spasm.  Patient in the emergency room had Lainez catheter placed and his clots were flushed.  He was placed on CBI and is admitted to the hospital for further management. 69-year-old male with past medical history of hypertension, CAD, status post stent placement, atrial fibrillation, status post ablation, hyperlipidemia, BPH, status post TURPx2, who came to the emergency room complaining of Blood in the urine, passing clots and urinary retention.  Patient apparently had similar episodes in the past which required continuous bladder irrigation.  Patient complain of some lower abdominal discomfort and spasm.  Patient in the emergency room had Lainez catheter placed and his clots were flushed.  He was placed on CBI and is admitted to the hospital for further management.  There is no edema, suspected DTI to L buttock  Pt has nkfa, understands rationale for DASH/TLC diet and importance of good nutrition including adeq protein intake. Pt provided with alternate menu selections to maximize oral intake. Foods will be provided as allowed in recommended diet. Currently  PO intake is  good

## 2020-03-01 NOTE — PROVIDER CONTACT NOTE (OTHER) - ASSESSMENT
AxO x4, anxious at times when experiencing bladder spasms. CBI currently running at titratable rate of medium to high flow in order to yield pink-tinged urine per MD order, hourly outputs reveal adequate output. CBI does pause during spasmodic episodes; manual irrigation done PRN per order, no discernible clots evacuated. Patient previously given 0.25 mg Xanax PO x1 to assist with muscle relaxation. Patient requesting removal of 3-way Lainez catheter and placement of 2-way catheter with manual evacuation of clots.

## 2020-03-01 NOTE — PROVIDER CONTACT NOTE (OTHER) - RECOMMENDATIONS
Maintain 3 way Lainez catheter with CBI to clear urine of blood and clots. May give oxybutinin PO to treat bladder spasm.

## 2020-03-02 ENCOUNTER — TRANSCRIPTION ENCOUNTER (OUTPATIENT)
Age: 70
End: 2020-03-02

## 2020-03-02 VITALS
DIASTOLIC BLOOD PRESSURE: 80 MMHG | SYSTOLIC BLOOD PRESSURE: 131 MMHG | OXYGEN SATURATION: 96 % | RESPIRATION RATE: 18 BRPM | TEMPERATURE: 98 F | HEART RATE: 60 BPM

## 2020-03-02 LAB
ANION GAP SERPL CALC-SCNC: 5 MMOL/L — SIGNIFICANT CHANGE UP (ref 5–17)
BUN SERPL-MCNC: 11 MG/DL — SIGNIFICANT CHANGE UP (ref 7–23)
CALCIUM SERPL-MCNC: 8.4 MG/DL — SIGNIFICANT CHANGE UP (ref 8.4–10.5)
CHLORIDE SERPL-SCNC: 104 MMOL/L — SIGNIFICANT CHANGE UP (ref 96–108)
CO2 SERPL-SCNC: 31 MMOL/L — SIGNIFICANT CHANGE UP (ref 22–31)
CREAT SERPL-MCNC: 1.09 MG/DL — SIGNIFICANT CHANGE UP (ref 0.5–1.3)
GLUCOSE SERPL-MCNC: 101 MG/DL — HIGH (ref 70–99)
HCT VFR BLD CALC: 37.9 % — LOW (ref 39–50)
HGB BLD-MCNC: 13.2 G/DL — SIGNIFICANT CHANGE UP (ref 13–17)
MCHC RBC-ENTMCNC: 30.9 PG — SIGNIFICANT CHANGE UP (ref 27–34)
MCHC RBC-ENTMCNC: 34.8 GM/DL — SIGNIFICANT CHANGE UP (ref 32–36)
MCV RBC AUTO: 88.8 FL — SIGNIFICANT CHANGE UP (ref 80–100)
NRBC # BLD: 0 /100 WBCS — SIGNIFICANT CHANGE UP (ref 0–0)
PLATELET # BLD AUTO: 163 K/UL — SIGNIFICANT CHANGE UP (ref 150–400)
POTASSIUM SERPL-MCNC: 3.6 MMOL/L — SIGNIFICANT CHANGE UP (ref 3.5–5.3)
POTASSIUM SERPL-SCNC: 3.6 MMOL/L — SIGNIFICANT CHANGE UP (ref 3.5–5.3)
RBC # BLD: 4.27 M/UL — SIGNIFICANT CHANGE UP (ref 4.2–5.8)
RBC # FLD: 13.2 % — SIGNIFICANT CHANGE UP (ref 10.3–14.5)
SODIUM SERPL-SCNC: 140 MMOL/L — SIGNIFICANT CHANGE UP (ref 135–145)
WBC # BLD: 6.47 K/UL — SIGNIFICANT CHANGE UP (ref 3.8–10.5)
WBC # FLD AUTO: 6.47 K/UL — SIGNIFICANT CHANGE UP (ref 3.8–10.5)

## 2020-03-02 PROCEDURE — 86900 BLOOD TYPING SEROLOGIC ABO: CPT

## 2020-03-02 PROCEDURE — 85610 PROTHROMBIN TIME: CPT

## 2020-03-02 PROCEDURE — 84145 PROCALCITONIN (PCT): CPT

## 2020-03-02 PROCEDURE — 86901 BLOOD TYPING SEROLOGIC RH(D): CPT

## 2020-03-02 PROCEDURE — 85027 COMPLETE CBC AUTOMATED: CPT

## 2020-03-02 PROCEDURE — 80048 BASIC METABOLIC PNL TOTAL CA: CPT

## 2020-03-02 PROCEDURE — 36415 COLL VENOUS BLD VENIPUNCTURE: CPT

## 2020-03-02 PROCEDURE — 86803 HEPATITIS C AB TEST: CPT

## 2020-03-02 PROCEDURE — 96375 TX/PRO/DX INJ NEW DRUG ADDON: CPT

## 2020-03-02 PROCEDURE — 81001 URINALYSIS AUTO W/SCOPE: CPT

## 2020-03-02 PROCEDURE — 80053 COMPREHEN METABOLIC PANEL: CPT

## 2020-03-02 PROCEDURE — 83036 HEMOGLOBIN GLYCOSYLATED A1C: CPT

## 2020-03-02 PROCEDURE — 86850 RBC ANTIBODY SCREEN: CPT

## 2020-03-02 PROCEDURE — 85730 THROMBOPLASTIN TIME PARTIAL: CPT

## 2020-03-02 PROCEDURE — 96374 THER/PROPH/DIAG INJ IV PUSH: CPT

## 2020-03-02 PROCEDURE — 99285 EMERGENCY DEPT VISIT HI MDM: CPT | Mod: 25

## 2020-03-02 RX ORDER — APIXABAN 2.5 MG/1
1 TABLET, FILM COATED ORAL
Qty: 0 | Refills: 0 | DISCHARGE

## 2020-03-02 RX ORDER — ASPIRIN/CALCIUM CARB/MAGNESIUM 324 MG
1 TABLET ORAL
Qty: 0 | Refills: 0 | DISCHARGE

## 2020-03-02 RX ORDER — ACETAMINOPHEN 500 MG
2 TABLET ORAL
Qty: 0 | Refills: 0 | DISCHARGE
Start: 2020-03-02

## 2020-03-02 RX ORDER — MAGNESIUM HYDROXIDE 400 MG/1
30 TABLET, CHEWABLE ORAL
Qty: 0 | Refills: 0 | DISCHARGE
Start: 2020-03-02

## 2020-03-02 RX ORDER — POLYETHYLENE GLYCOL 3350 17 G/17G
17 POWDER, FOR SOLUTION ORAL
Qty: 0 | Refills: 0 | DISCHARGE
Start: 2020-03-02

## 2020-03-02 RX ORDER — OXYCODONE HYDROCHLORIDE 5 MG/1
1 TABLET ORAL
Qty: 8 | Refills: 0
Start: 2020-03-02 | End: 2020-03-03

## 2020-03-02 RX ORDER — SERTRALINE 25 MG/1
2 TABLET, FILM COATED ORAL
Qty: 0 | Refills: 0 | DISCHARGE
Start: 2020-03-02

## 2020-03-02 RX ADMIN — FINASTERIDE 5 MILLIGRAM(S): 5 TABLET, FILM COATED ORAL at 12:28

## 2020-03-02 RX ADMIN — OXYCODONE HYDROCHLORIDE 5 MILLIGRAM(S): 5 TABLET ORAL at 00:50

## 2020-03-02 RX ADMIN — SERTRALINE 200 MILLIGRAM(S): 25 TABLET, FILM COATED ORAL at 12:28

## 2020-03-02 RX ADMIN — Medication 100 MILLIGRAM(S): at 06:50

## 2020-03-02 RX ADMIN — POLYETHYLENE GLYCOL 3350 17 GRAM(S): 17 POWDER, FOR SOLUTION ORAL at 12:28

## 2020-03-02 RX ADMIN — OXYCODONE HYDROCHLORIDE 5 MILLIGRAM(S): 5 TABLET ORAL at 06:50

## 2020-03-02 RX ADMIN — MAGNESIUM HYDROXIDE 30 MILLILITER(S): 400 TABLET, CHEWABLE ORAL at 06:50

## 2020-03-02 RX ADMIN — OXYCODONE HYDROCHLORIDE 5 MILLIGRAM(S): 5 TABLET ORAL at 00:06

## 2020-03-02 RX ADMIN — Medication 0.5 MILLIGRAM(S): at 04:00

## 2020-03-02 RX ADMIN — OXYCODONE HYDROCHLORIDE 5 MILLIGRAM(S): 5 TABLET ORAL at 07:30

## 2020-03-02 NOTE — DISCHARGE NOTE PROVIDER - NSDCCPCAREPLAN_GEN_ALL_CORE_FT
PRINCIPAL DISCHARGE DIAGNOSIS  Diagnosis: Hematuria  Assessment and Plan of Treatment: Keep yourself well hydrated.   Increase activity as tolerated.   Follow up with Urology as out patient in 1-3 days.   Follow up with Dr. Kim in 1-2 weeks.   Resume ASA and Eliquis once cleared by urology.      SECONDARY DISCHARGE DIAGNOSES  Diagnosis: Benign prostatic hyperplasia with urinary retention  Assessment and Plan of Treatment: Benign prostatic hyperplasia with urinary retention    Diagnosis: CAD (coronary artery disease)  Assessment and Plan of Treatment: CAD (coronary artery disease)    Diagnosis: Paroxysmal atrial fibrillation  Assessment and Plan of Treatment: Paroxysmal atrial fibrillation    Diagnosis: Urinary retention  Assessment and Plan of Treatment:     Diagnosis: Hyperlipidemia, unspecified hyperlipidemia type  Assessment and Plan of Treatment: Hyperlipidemia, unspecified hyperlipidemia type

## 2020-03-02 NOTE — PROGRESS NOTE ADULT - PROBLEM SELECTOR PLAN 2
Patient is s/p ablation in past.   Continue Metoprolol for rate control.   Continue to hold Eliquis due to ongoing bleeding.
Patient is s/p ablation in past.   Continue Metoprolol for rate control.   Continue to hold Eliquis due to ongoing bleeding.

## 2020-03-02 NOTE — DISCHARGE NOTE PROVIDER - NSDCMRMEDTOKEN_GEN_ALL_CORE_FT
acetaminophen 325 mg oral tablet: 2 tab(s) orally every 6 hours, As needed, Mild Pain (1 - 3)  Aspirin Low Dose 81 mg oral delayed release tablet: 1 tab(s) orally once a day.  Hold till cleared by urology.   Eliquis 5 mg oral tablet: 1 tab(s) orally 2 times a day.    Resume only when cleared by cardiology and urology.   Flomax: 0.8 milligram(s) orally once a day (at bedtime)  Lipitor 80 mg oral tablet: 1 tab(s) orally once a day  magnesium hydroxide 8% oral suspension: 30 milliliter(s) orally 2 times a day  oxyCODONE 5 mg oral tablet: 1 tab(s) orally 4 times a day, As needed, Severe Pain (7 - 10) MDD:4  polyethylene glycol 3350 oral powder for reconstitution: 17 gram(s) orally once a day  Proscar 5 mg oral tablet: 1 tab(s) orally once a day (at bedtime)  sertraline 100 mg oral tablet: 2 tab(s) orally once a day  Toprol-XL: 75 milligram(s) orally 2 times a day

## 2020-03-02 NOTE — DISCHARGE NOTE NURSING/CASE MANAGEMENT/SOCIAL WORK - PATIENT PORTAL LINK FT
You can access the FollowMyHealth Patient Portal offered by [facility] by registering at the following website: [email or web address]. By joining [facility]’s FollowMyHealth portal, you will also be able to view your health information using other applications (apps) compatible with our system.

## 2020-03-02 NOTE — DISCHARGE NOTE PROVIDER - HOSPITAL COURSE
Patient admitted with hematuria.     He was placed on CBI. Patient had bladder spasms.     He was seen by Urology.     Patient is improving.     He will be discharged once cleared by urology.     Patient is off his Eliquis and ASA and will resume once cleared by urology.     He will follow up with his PMD, Cardiology and urology as out patient.

## 2020-03-02 NOTE — DISCHARGE NOTE PROVIDER - CARE PROVIDER_API CALL
Víctor Kim)  Internal Medicine  [address] [place] [address]  Phone: [phone]  Fax: [phone]  Established Patient  Follow Up Time: 2 weeks

## 2020-03-02 NOTE — PROGRESS NOTE ADULT - PROBLEM SELECTOR PLAN 1
Patient with hematuria, POA.   Possibly due to prostatitis.   Improved.   Management as per urology.   May be able to discharge later if urine remains clear and patient able to tolerate TOV.
Patient with hematuria, POA.   Possibly due to prostatitis.   Continue CBI and Prn hand irrigation of clots as per urology.   Will hold ASA and Eliquis for now due to ongoing hematuria.   Follow culture data.   Further management as per patient's clinical course.

## 2020-03-02 NOTE — DISCHARGE NOTE PROVIDER - NSDCACTIVITY_GEN_ALL_CORE
Walking - Indoors allowed/Showering allowed/Walking - Outdoors allowed/Bathing allowed/Stairs allowed/No heavy lifting/straining

## 2020-03-02 NOTE — DISCHARGE NOTE NURSING/CASE MANAGEMENT/SOCIAL WORK - NSDCPEELIQUIS_GEN_ALL_CORE
1 pair
Apixaban/Eliquis - Dietary Advice/Apixaban/Eliquis - Compliance/Apixaban/Eliquis - Follow up monitoring/Apixaban/Eliquis - Potential for adverse drug reactions and interactions

## 2020-03-02 NOTE — PROGRESS NOTE ADULT - SUBJECTIVE AND OBJECTIVE BOX
INTERVAL HPI/OVERNIGHT EVENTS:  Min bladder spasm past 24hrs.    MEDICATIONS  (STANDING):  atorvastatin 80 milliGRAM(s) Oral at bedtime  finasteride 5 milliGRAM(s) Oral daily  magnesium hydroxide Suspension 30 milliLiter(s) Oral two times a day  metoprolol succinate  milliGRAM(s) Oral daily  polyethylene glycol 3350 17 Gram(s) Oral daily  sertraline 200 milliGRAM(s) Oral daily  sodium chloride 0.45%. 1000 milliLiter(s) (75 mL/Hr) IV Continuous <Continuous>  tamsulosin 0.8 milliGRAM(s) Oral at bedtime    MEDICATIONS  (PRN):  acetaminophen   Tablet .. 650 milliGRAM(s) Oral every 6 hours PRN Mild Pain (1 - 3)  ALPRAZolam 0.5 milliGRAM(s) Oral every 8 hours PRN Anxiety  oxyCODONE    IR 5 milliGRAM(s) Oral four times a day PRN Severe Pain (7 - 10)  traMADol 25 milliGRAM(s) Oral four times a day PRN Moderate Pain (4 - 6)        Vital Signs Last 24 Hrs  T(C): 36.8 (02 Mar 2020 09:55), Max: 37.1 (01 Mar 2020 20:59)  T(F): 98.3 (02 Mar 2020 09:55), Max: 98.7 (01 Mar 2020 20:59)  HR: 75 (02 Mar 2020 09:55) (66 - 77)  BP: 115/72 (02 Mar 2020 09:55) (105/70 - 135/67)  BP(mean): --  RR: 19 (02 Mar 2020 09:55) (18 - 19)  SpO2: 94% (02 Mar 2020 09:55) (94% - 96%)    PHYSICAL EXAM:    ABDOMEN: benign  GENITALIA: bowser clear cbi    LABS:                        13.2   6.47  )-----------( 163      ( 02 Mar 2020 07:44 )             37.9     03-02    140  |  104  |  11  ----------------------------<  101<H>  3.6   |  31  |  1.09    Ca    8.4      02 Mar 2020 07:44            Blood Cultures:    RADIOLOGY & ADDITIONAL TESTS:

## 2020-03-02 NOTE — PROGRESS NOTE ADULT - SUBJECTIVE AND OBJECTIVE BOX
Patient is a 69y old  Male who presents with a chief complaint of [place] (01 Mar 2020 13:02)    HPI: 69-year-old male with past medical history of hypertension, CAD, status post stent placement, atrial fibrillation, status post ablation, hyperlipidemia, BPH, status post TURPx2, who came to the emergency room complaining of Blood in the urine, passing clots and urinary retention.  Patient apparently had similar episodes in the past which required continuous bladder irrigation.  Patient complain of some lower abdominal discomfort and spasm.  Patient in the emergency room had Lainez catheter placed and his clots were flushed.  He was placed on CBI and is admitted to the hospital for further management.    Patient denies any chest pain or chest pressure.  He denies any fevers or chills.  He denies any dizziness or syncope.  He denies any nausea or vomiting. (29 Feb 2020 14:24)    INTERVAL HPI/OVERNIGHT EVENTS:  Chart reviewed, notes reviewed.   Patient seen and examined.  Being followed by following specialists.     Consultant(s) Notes Reviewed:  [X] Yes    Care Discussed with Consultants/Other Providers: [X] Yes    03/01/2020 --> Events noted. Patient ent had increasing pain and bladder spasm last night. Refused CBI and had regular Lainez placed with hand irrigation. Now back on CBI since this morning.      03/02/2020 --> Improving slowly. Urine is clearing up. Spasms have improved. No chest pain or pressure.     REVIEW OF SYSTEMS:  CONSTITUTIONAL: No fever, weight loss, or fatigue  EYES: No eye pain, or discharge  ENMT: No sinus or throat pain  NECK: No pain or stiffness  BREASTS: No pain, masses, or nipple discharge  RESPIRATORY: No cough, wheezing, chills or hemoptysis; No shortness of breath  CARDIOVASCULAR: No chest pain, palpitations, dizziness, or leg swelling  GASTROINTESTINAL: No abdominal or epigastric pain. No nausea, vomiting.  GENITOURINARY: + hematuria, + bladder spasms.   NEUROLOGICAL: No loss of strength, numbness, or tremors  SKIN: No itching, burning, rashes, or lesions   LYMPH NODES: No enlarged glands  ENDOCRINE: No polydipsia or polyuria  MUSCULOSKELETAL: No muscle, back, or extremity pain  PSYCHIATRIC: No depression, anxiety, mood swings.  HEME/LYMPH: No easy bruising, or bleeding gums  ALLERGY AND IMMUNOLOGIC: No hives or eczema    Allergies: penicillin (Rash)    Intolerances    Home Medications:  Aspirin Low Dose 81 mg oral delayed release tablet: 1 tab(s) orally once a day (28 Feb 2020 20:15)  Eliquis 5 mg oral tablet: 1 tab(s) orally 2 times a day (29 Feb 2020 00:22)  Flomax: 0.8 milligram(s) orally once a day (at bedtime) (28 Feb 2020 20:15)  Lipitor 80 mg oral tablet: 1 tab(s) orally once a day (28 Feb 2020 20:15)  Proscar 5 mg oral tablet: 1 tab(s) orally once a day (at bedtime) (28 Feb 2020 20:15)  Toprol-XL: 75 milligram(s) orally 2 times a day (29 Feb 2020 00:22)    MEDICATIONS  (STANDING):  atorvastatin 80 milliGRAM(s) Oral at bedtime  finasteride 5 milliGRAM(s) Oral daily  magnesium hydroxide Suspension 30 milliLiter(s) Oral two times a day  metoprolol succinate  milliGRAM(s) Oral daily  polyethylene glycol 3350 17 Gram(s) Oral daily  sertraline 200 milliGRAM(s) Oral daily  sodium chloride 0.45%. 1000 milliLiter(s) (75 mL/Hr) IV Continuous <Continuous>  tamsulosin 0.8 milliGRAM(s) Oral at bedtime    MEDICATIONS  (PRN):  acetaminophen   Tablet .. 650 milliGRAM(s) Oral every 6 hours PRN Mild Pain (1 - 3)  ALPRAZolam 0.5 milliGRAM(s) Oral every 8 hours PRN Anxiety  oxyCODONE    IR 5 milliGRAM(s) Oral four times a day PRN Severe Pain (7 - 10)  traMADol 25 milliGRAM(s) Oral four times a day PRN Moderate Pain (4 - 6)    Vital Signs Last 24 Hrs  T(C): 36.6 (02 Mar 2020 13:25), Max: 37.1 (01 Mar 2020 20:59)  T(F): 97.9 (02 Mar 2020 13:25), Max: 98.7 (01 Mar 2020 20:59)  HR: 73 (02 Mar 2020 13:25) (67 - 76)  BP: 129/87 (02 Mar 2020 13:25) (115/72 - 135/67)  BP(mean): --  RR: 18 (02 Mar 2020 13:25) (18 - 19)  SpO2: 95% (02 Mar 2020 13:25) (94% - 95%)    PHYSICAL EXAM:  GENERAL: NAD, well-groomed, well-developed  HEAD:  Atraumatic, Normocephalic  EYES: EOMI, PERRLA, conjunctiva and sclera clear  ENMT: Moist mucous membranes, no lesions  NECK: Supple.  CHEST/LUNG: Clear to auscultation bilaterally; No rales, rhonchi, wheezing, or rubs  HEART: S1, S2.   ABDOMEN: Soft, Nontender, Nondistended; Bowel sounds present  EXTREMITIES:  2+ Peripheral Pulses, No clubbing, cyanosis, or edema  MS: No joint swelling or deformity.   LYMPH: No lymphadenopathy noted  SKIN: No rashes or lesions  NERVOUS SYSTEM:  No focal deficit.   PSYCH:  Awake and alert.     LABS:                             13.2   6.47  )-----------( 163      ( 02 Mar 2020 07:44 )             37.9     02 Mar 2020 07:44    140    |  104    |  11     ----------------------------<  101    3.6     |  31     |  1.09     Ca    8.4        02 Mar 2020 07:44    02-29 [id number] 5.2    Procalcitonin, Serum: 0.03 ng/mL (02-29-20 @ 14:30)    RADIOLOGY TEST: (IMAGES REVIEWED BY ME)    Imaging Personally Reviewed:  [X] YES      HEALTH ISSUES - PROBLEM Dx:  Prophylactic measure: Prophylactic measure  Hyperlipidemia, unspecified hyperlipidemia type: Hyperlipidemia, unspecified hyperlipidemia type  Benign prostatic hyperplasia with urinary retention: Benign prostatic hyperplasia with urinary retention  CAD (coronary artery disease): CAD (coronary artery disease)  Paroxysmal atrial fibrillation: Paroxysmal atrial fibrillation  Gross hematuria: Gross hematuria

## 2020-03-02 NOTE — PROGRESS NOTE ADULT - ASSESSMENT
69-year-old male with past medical history of hypertension, CAD, status post stent placement, atrial fibrillation, status post ablation, hyperlipidemia, BPH, status post TURPx2, who came to the emergency room complaining of Blood in the urine, passing clots and urinary retention.  Patient apparently had similar episodes in the past which required continuous bladder irrigation.  Patient complain of some lower abdominal discomfort and spasm.  Patient in the emergency room had Lainez catheter placed and his clots were flushed.  He was placed on CBI and is admitted to the hospital for further management.
Improving hematuria. Bladder spasm 2nd bowser. No BM x 48hrs    Cont bowser/cbn  Hand irrigate bowser prn clots  Hold ASA/eliquis per cardiology  MOM/colace for constipation.
Resolved hematuria.    d/c cbi  ?TOV later today
69-year-old male with past medical history of hypertension, CAD, status post stent placement, atrial fibrillation, status post ablation, hyperlipidemia, BPH, status post TURPx2, who came to the emergency room complaining of Blood in the urine, passing clots and urinary retention.  Patient apparently had similar episodes in the past which required continuous bladder irrigation.  Patient complain of some lower abdominal discomfort and spasm.  Patient in the emergency room had Lainez catheter placed and his clots were flushed.  He was placed on CBI and is admitted to the hospital for further management.

## 2021-03-16 ENCOUNTER — TRANSCRIPTION ENCOUNTER (OUTPATIENT)
Age: 71
End: 2021-03-16

## 2021-05-30 ENCOUNTER — TRANSCRIPTION ENCOUNTER (OUTPATIENT)
Age: 71
End: 2021-05-30

## 2021-07-19 ENCOUNTER — TRANSCRIPTION ENCOUNTER (OUTPATIENT)
Age: 71
End: 2021-07-19

## 2022-10-17 NOTE — DISCHARGE NOTE PROVIDER - NSDCQMSTROKE_NEU_ALL_CORE
This note was completed with dictation software and grammatical errors may exist.    Chief Complaint   Patient presents with    Follow-up        HPI: Li Singh is a 55 y.o. year old female patient who has a past medical history of Anxiety, Gastric ulcer, Hyperlipidemia, and Hypertension. She presents in referral from Shikha Steen for neck pain.  She is status post C7-T1 interlaminar epidural steroid injection on 09/22/2022 initially reporting 30% relief, now reporting minimal relief.  The patient is new to me.  She states that she was feeling a little better until she used her tractor and also tripped and fell.  The patient complains of left greater than right neck pain radiating to trapezius muscles.  She reports right arm pain but only about once a week with sleeping.  Her neck pain is her biggest concern and worse with any activity.  She also complains of bilateral low back pain radiating to left greater than right lateral buttocks into the bottom of her left foot.  She reports taking a family member's Percocet at times along with Soma that has been prescribed to her.  She reports occasional numbness in her arms.  She denies weakness or incontinence.    Previous history:  The patient reports that she has had neck pain for about the last 10 years, denies any specific trauma that may have caused this.  Is located in her bilateral neck at the base of her neck out into the bilateral  numbness and tingling in the right arm especially when tilting her head to the left and gets numbness ,  and tingling in the 1st through 3rd fingers.  The pain also radiates into the trapezius sometimes into the clavicle and down the middle of her spine through the shoulder blades.   She reports that at time she gets headaches in the occipital region that advanced into the front of her head at times as well.   She denies any balance issues, denies any regan weakness.    Pain intervention history:  She apparently has undergone  "injections in her neck with no relief.She is status post C7-T1 interlaminar epidural steroid injection on 09/22/2022 initially reporting 30% relief, now reporting minimal relief.      Spine surgeries:  The patient states that she had seen Dr. Al in the past who had recommended surgery    Antineuropathics:  Gabapentin 600 3 times daily  NSAIDs:  Physical therapy:  She is done physical therapy in the past, chiropractic care with some benefit.  She states that she was seeing a chiropractor at least once a week but was not able to do this for 3 months and states that her pain greatly worsened during that time.  Antidepressants:  BuSpar 5 mg, Wellbutrin 150 mg  Muscle relaxers:  Xanax 1 mg, Soma  Opioids:  Antiplatelets/Anticoagulants:  Aspirin 81    ROS:  She reports headaches, stomach ulcer, easy bruising, joint stiffness, back pain, memory loss, dizziness, difficulty sleeping.  Balance of review of systems negative.    No results found for: LABA1C, HGBA1C    No results found for: WBC, HGB, HCT, MCV, PLT    Past Medical History:   Diagnosis Date    Anxiety     Gastric ulcer     Hyperlipidemia     Hypertension        Past Surgical History:   Procedure Laterality Date    EPIDURAL STEROID INJECTION INTO CERVICAL SPINE N/A 9/22/2022    Procedure: Injection-steroid-epidural-cervical;  Surgeon: Ronald Bernabe MD;  Location: [facility] OR;  Service: Pain Management;  Laterality: N/A;       Social History     Socioeconomic History    Marital status:    Tobacco Use    Smoking status: Unknown         Medications/Allergies: See med card    Vitals:    10/17/22 1116   BP: 124/62   Pulse: 63   SpO2: 95%   Weight: 91.1 kg (200 lb 13.4 oz)   Height: 5' 6" (1.676 m)   PainSc:   8   PainLoc: Back     Body mass index is 32.42 kg/m².    Physical exam:  Gen: A and O x3, pleasant, well-groomed  Skin: No rashes or obvious lesions  HEENT: PERRLA, no obvious deformities on ears or in canals.Trachea midline.  CVS: Regular rate and " rhythm, normal palpable pulses.  Resp: Clear to auscultation bilaterally, no wheezes or rales.  Abdomen: Soft, NT/ND.  Musculoskeletal: Able to heel walk, toe walk. No antalgic gait.     Neuro:  Motor:    Right Left   C4 Shoulder Abduction  5  5   C5 Elbow Flexion    5  5   C6 Wrist Extension  5  5   C7 Elbow Extension   5  5   C8/T1 Hand Intrinsics   5  5   C8 First Dorsal Interosseus  5  5   C8 Abductor Pollicus Brevis  5  5       Iliopsoas Quadriceps Knee  Flexion Tibialis  anterior Gastro- cnemius EHL   Lower: R / 5/5 5/5 5/5 5/5 5/5    L // 5 5 5 5        Left  Right    Triceps DTR 2+ 2+   Biceps DTR 2+ 2+   Brachioradialis DTR 2+ 2+   Patellar DTR 1+ 1+   Achilles DTR 1+ 1+   Lucio Absent  Absent   Clonus Absent Absent          Sensory: Intact and symmetrical to light touch and pinprick in C2-T1 dermatomes bilaterally. Intact and symmetrical to light touch and pinprick in L1-S1 dermatomes bilaterally.    Cervical spine: ROM is full in flexion, extension and lateral rotation with increased pain on extension greater than flexion, lateral rotation increased pain to the left greater than right  Spurling's maneuver causes neck pain to either side, left greater than right..  Myofascial exam: No Tenderness to palpation across cervical paraspinous region bilaterally.    Lumbar spine: ROM is full with flexion extension and oblique extension with no increased pain.    Talib's test causes no increased pain on either side.    Supine straight leg raise is negative bilaterally.    Internal and external rotation of the hip causes no increased pain on either side.  Myofascial exam: No tenderness to palpation across lumbar paraspinous muscles.    Imagin22 MRI C-spine:  Alignment: Reversal of the normal cervical lordosis centered at C4.  Minimal anterolisthesis of C3 on C4, 2 mm.  Minimal retrolisthesis of C4 on C5 and C5 on C6, 1-2 mm.   Vertebral Column: Vertebral body heights are maintained.  No  evidence of an acute fracture or aggressive marrow replacement process. Probable small hemangiomas within the C4 C7 vertebral bodies.  Moderate disc degeneration at C4-5 and C5-6 with moderate intervertebral disc space narrowing, degenerative endplate change and marginal osteophyte formation.   Cord: Mild flattening of the left ventral aspect of the cord at C4-5.  No focal cord signal abnormality.   Skull base and craniocervical junction: Normal.   Degenerative findings:   C2-C3: The disc is normal in configuration. Mild left facet arthropathy.  There is no neural foraminal stenosis.  There is no spinal canal stenosis.   C3-C4: Mild posterior disc osteophyte complex, which mildly effaces the ventral thecal sac.  Mild left facet arthropathy.  Mild left neural foraminal stenosis.  There is no spinal canal stenosis.   C4-C5: Left asymmetric posterior disc osteophyte complex, which mildly effaces the ventral thecal sac and flattens the left ventral aspect of the cord without significant cord compression or focal cord signal abnormality.  Mild bilateral facet arthropathy.  Moderate left uncovertebral joint spurring.  Moderate left neural foraminal stenosis.  There is no spinal canal stenosis.   C5-C6: Posterior disc osteophyte complex.  Mild bilateral facet arthropathy.  Marked right and moderate left uncovertebral joint spurring.  Severe right and moderate left neural foraminal stenosis.  Ligamentum flavum thickening.  Mild spinal canal stenosis.   C6-C7: Mild posterior disc osteophyte complex.  Mild bilateral facet arthropathy.  Mild left uncovertebral joint spurring.  Mild left neural foraminal stenosis.  There is no spinal canal stenosis.   C7-T1: The disc is normal in configuration.  Mild bilateral facet arthropathy and uncovertebral joint spurring.  Mild bilateral neural foraminal stenosis.  There is no spinal canal stenosis.    Assessment:   Li Singh is a 55 y.o. year old female patient who has a past medical  history of Anxiety, Gastric ulcer, Hyperlipidemia, and Hypertension. She presents in referral from Shikha Steen for neck pain.     1. Cervical spondylosis  Ambulatory referral/consult to Pain Clinic    X-Ray Cervical Spine 5 View With Flex And Ext    Vital signs    Place 18-22 gaua peripheral IV     Verify informed consent    Notify physician     Notify physician     Notify physician (specify)    Diet NPO    Case Request Operating Room: Block-nerve-medial branch-cervical C4/5 and C5/6    Place in Outpatient    lactated ringers infusion      2. DDD (degenerative disc disease), cervical        3. DDD (degenerative disc disease), lumbar  MRI Lumbar Spine Without Contrast    X-Ray Lumbar Complete Including Flex And Ext          Plan:  1. The patient did not have significant relief following the epidural steroid injection and we discussed that her pain is likely more facet mediated.  I am going to schedule her for bilateral C4/5 and C5/6 diagnostic medial branch nerve blocks.  If successful we will repeat the blocks prior to proceeding with radiofrequency ablation.    2. We discussed her low back and left foot pain.  I am going to order x-rays and a lumbar spine MRI to further evaluate this.  3. We discussed medication and I advised her against taking medicine from anybody and that we do not suggest taking opioids and also do not suggest Soma.  4. Follow-up in 4 weeks postprocedure or sooner as needed.               No

## 2024-08-09 PROBLEM — I48.91 UNSPECIFIED ATRIAL FIBRILLATION: Chronic | Status: ACTIVE | Noted: 2020-02-29

## 2024-08-09 PROBLEM — E78.5 HYPERLIPIDEMIA, UNSPECIFIED: Chronic | Status: ACTIVE | Noted: 2020-02-29

## 2024-09-03 ENCOUNTER — APPOINTMENT (OUTPATIENT)
Dept: UROLOGY | Facility: CLINIC | Age: 74
End: 2024-09-03
Payer: MEDICARE

## 2024-09-03 VITALS
BODY MASS INDEX: 37.1 KG/M2 | HEART RATE: 89 BPM | WEIGHT: 265 LBS | DIASTOLIC BLOOD PRESSURE: 77 MMHG | OXYGEN SATURATION: 97 % | SYSTOLIC BLOOD PRESSURE: 116 MMHG | HEIGHT: 71 IN

## 2024-09-03 DIAGNOSIS — N13.8 BENIGN PROSTATIC HYPERPLASIA WITH LOWER URINARY TRACT SYMPMS: ICD-10-CM

## 2024-09-03 DIAGNOSIS — N40.0 BENIGN PROSTATIC HYPERPLASIA WITHOUT LOWER URINARY TRACT SYMPMS: ICD-10-CM

## 2024-09-03 DIAGNOSIS — N40.1 BENIGN PROSTATIC HYPERPLASIA WITH LOWER URINARY TRACT SYMPMS: ICD-10-CM

## 2024-09-03 PROCEDURE — 99204 OFFICE O/P NEW MOD 45 MIN: CPT

## 2024-09-03 PROCEDURE — 51741 ELECTRO-UROFLOWMETRY FIRST: CPT

## 2024-09-03 PROCEDURE — 51798 US URINE CAPACITY MEASURE: CPT

## 2024-09-03 PROCEDURE — 99214 OFFICE O/P EST MOD 30 MIN: CPT

## 2024-09-03 NOTE — PHYSICAL EXAM
[General Appearance - Well Developed] : well developed [General Appearance - Well Nourished] : well nourished [] : no respiratory distress [Abdomen Soft] : soft [de-identified] : pt deferred

## 2024-09-03 NOTE — HISTORY OF PRESENT ILLNESS
[Urinary Frequency] : urinary frequency [Nocturia] : nocturia [None] : None [FreeTextEntry1] : ISAURA BEARDEN is a 73 year male who presents with bph, hx of TURP, PAE. He complains of frequency for many years. Mr. BEARDEN has been treated with flomax/ proscar with adequate improvements.   The patient is not sexually active

## 2024-09-03 NOTE — ASSESSMENT
[FreeTextEntry1] : uroflow/ pvr - low volume, 0 cc  1. BPH - s/p TURP, PAE, doing well. pt wishes to continue flomax/ proscar  2. PSA stable at 2.45 (double on finasteride), pt to send records, cont to monitor.

## 2025-03-05 ENCOUNTER — INPATIENT (INPATIENT)
Facility: HOSPITAL | Age: 75
LOS: 1 days | Discharge: HOME | DRG: 390 | End: 2025-03-07
Attending: FAMILY MEDICINE | Admitting: FAMILY MEDICINE
Payer: MEDICARE

## 2025-03-05 VITALS
TEMPERATURE: 98 F | HEART RATE: 85 BPM | WEIGHT: 257.94 LBS | OXYGEN SATURATION: 98 % | RESPIRATION RATE: 20 BRPM | SYSTOLIC BLOOD PRESSURE: 130 MMHG | HEIGHT: 71 IN | DIASTOLIC BLOOD PRESSURE: 84 MMHG

## 2025-03-05 DIAGNOSIS — I25.10 ATHEROSCLEROTIC HEART DISEASE OF NATIVE CORONARY ARTERY WITHOUT ANGINA PECTORIS: ICD-10-CM

## 2025-03-05 DIAGNOSIS — K56.609 UNSPECIFIED INTESTINAL OBSTRUCTION, UNSPECIFIED AS TO PARTIAL VERSUS COMPLETE OBSTRUCTION: ICD-10-CM

## 2025-03-05 DIAGNOSIS — I48.0 PAROXYSMAL ATRIAL FIBRILLATION: ICD-10-CM

## 2025-03-05 DIAGNOSIS — N40.0 BENIGN PROSTATIC HYPERPLASIA WITHOUT LOWER URINARY TRACT SYMPTOMS: ICD-10-CM

## 2025-03-05 LAB
ALBUMIN SERPL ELPH-MCNC: 4.1 G/DL — SIGNIFICANT CHANGE UP (ref 3.3–5)
ALP SERPL-CCNC: 109 U/L — SIGNIFICANT CHANGE UP (ref 30–120)
ALT FLD-CCNC: 30 U/L — SIGNIFICANT CHANGE UP (ref 10–60)
ANION GAP SERPL CALC-SCNC: 8 MMOL/L — SIGNIFICANT CHANGE UP (ref 5–17)
APPEARANCE UR: CLEAR — SIGNIFICANT CHANGE UP
APTT BLD: 36.6 SEC — HIGH (ref 24.5–35.6)
AST SERPL-CCNC: 20 U/L — SIGNIFICANT CHANGE UP (ref 10–40)
BASOPHILS # BLD AUTO: 0.01 K/UL — SIGNIFICANT CHANGE UP (ref 0–0.2)
BASOPHILS NFR BLD AUTO: 0.1 % — SIGNIFICANT CHANGE UP (ref 0–2)
BILIRUB SERPL-MCNC: 1 MG/DL — SIGNIFICANT CHANGE UP (ref 0.2–1.2)
BILIRUB UR-MCNC: NEGATIVE — SIGNIFICANT CHANGE UP
BUN SERPL-MCNC: 17 MG/DL — SIGNIFICANT CHANGE UP (ref 7–23)
CALCIUM SERPL-MCNC: 9.6 MG/DL — SIGNIFICANT CHANGE UP (ref 8.4–10.5)
CHLORIDE SERPL-SCNC: 102 MMOL/L — SIGNIFICANT CHANGE UP (ref 96–108)
CO2 SERPL-SCNC: 29 MMOL/L — SIGNIFICANT CHANGE UP (ref 22–31)
COLOR SPEC: YELLOW — SIGNIFICANT CHANGE UP
CREAT SERPL-MCNC: 1.17 MG/DL — SIGNIFICANT CHANGE UP (ref 0.5–1.3)
DIFF PNL FLD: ABNORMAL
EGFR: 66 ML/MIN/1.73M2 — SIGNIFICANT CHANGE UP
EGFR: 66 ML/MIN/1.73M2 — SIGNIFICANT CHANGE UP
EOSINOPHIL # BLD AUTO: 0.02 K/UL — SIGNIFICANT CHANGE UP (ref 0–0.5)
EOSINOPHIL NFR BLD AUTO: 0.3 % — SIGNIFICANT CHANGE UP (ref 0–6)
GLUCOSE SERPL-MCNC: 126 MG/DL — HIGH (ref 70–99)
GLUCOSE UR QL: NEGATIVE MG/DL — SIGNIFICANT CHANGE UP
HCT VFR BLD CALC: 43.3 % — SIGNIFICANT CHANGE UP (ref 39–50)
HGB BLD-MCNC: 15.4 G/DL — SIGNIFICANT CHANGE UP (ref 13–17)
IMM GRANULOCYTES NFR BLD AUTO: 0.6 % — SIGNIFICANT CHANGE UP (ref 0–0.9)
INR BLD: 1.41 RATIO — HIGH (ref 0.85–1.16)
KETONES UR-MCNC: ABNORMAL MG/DL
LACTATE SERPL-SCNC: 1.5 MMOL/L — SIGNIFICANT CHANGE UP (ref 0.7–2)
LEUKOCYTE ESTERASE UR-ACNC: NEGATIVE — SIGNIFICANT CHANGE UP
LIDOCAIN IGE QN: 29 U/L — SIGNIFICANT CHANGE UP (ref 16–77)
LYMPHOCYTES # BLD AUTO: 1.05 K/UL — SIGNIFICANT CHANGE UP (ref 1–3.3)
LYMPHOCYTES # BLD AUTO: 14.9 % — SIGNIFICANT CHANGE UP (ref 13–44)
MCHC RBC-ENTMCNC: 33 PG — SIGNIFICANT CHANGE UP (ref 27–34)
MCHC RBC-ENTMCNC: 35.6 G/DL — SIGNIFICANT CHANGE UP (ref 32–36)
MCV RBC AUTO: 92.7 FL — SIGNIFICANT CHANGE UP (ref 80–100)
MONOCYTES # BLD AUTO: 0.72 K/UL — SIGNIFICANT CHANGE UP (ref 0–0.9)
MONOCYTES NFR BLD AUTO: 10.2 % — SIGNIFICANT CHANGE UP (ref 2–14)
NEUTROPHILS # BLD AUTO: 5.19 K/UL — SIGNIFICANT CHANGE UP (ref 1.8–7.4)
NEUTROPHILS NFR BLD AUTO: 73.9 % — SIGNIFICANT CHANGE UP (ref 43–77)
NITRITE UR-MCNC: NEGATIVE — SIGNIFICANT CHANGE UP
NRBC BLD AUTO-RTO: 0 /100 WBCS — SIGNIFICANT CHANGE UP (ref 0–0)
PH UR: 5 — SIGNIFICANT CHANGE UP (ref 5–8)
PLATELET # BLD AUTO: 128 K/UL — LOW (ref 150–400)
POTASSIUM SERPL-MCNC: 4 MMOL/L — SIGNIFICANT CHANGE UP (ref 3.5–5.3)
POTASSIUM SERPL-SCNC: 4 MMOL/L — SIGNIFICANT CHANGE UP (ref 3.5–5.3)
PROT SERPL-MCNC: 8.3 G/DL — SIGNIFICANT CHANGE UP (ref 6–8.3)
PROT UR-MCNC: 30 MG/DL
PROTHROM AB SERPL-ACNC: 16.6 SEC — HIGH (ref 9.9–13.4)
RBC # BLD: 4.67 M/UL — SIGNIFICANT CHANGE UP (ref 4.2–5.8)
RBC # FLD: 13.2 % — SIGNIFICANT CHANGE UP (ref 10.3–14.5)
SODIUM SERPL-SCNC: 139 MMOL/L — SIGNIFICANT CHANGE UP (ref 135–145)
SP GR SPEC: 1.09 — HIGH (ref 1–1.03)
TROPONIN I, HIGH SENSITIVITY RESULT: 13.1 NG/L — SIGNIFICANT CHANGE UP
UROBILINOGEN FLD QL: 1 MG/DL — SIGNIFICANT CHANGE UP (ref 0.2–1)
WBC # BLD: 7.03 K/UL — SIGNIFICANT CHANGE UP (ref 3.8–10.5)
WBC # FLD AUTO: 7.03 K/UL — SIGNIFICANT CHANGE UP (ref 3.8–10.5)

## 2025-03-05 PROCEDURE — 93010 ELECTROCARDIOGRAM REPORT: CPT

## 2025-03-05 PROCEDURE — 71045 X-RAY EXAM CHEST 1 VIEW: CPT | Mod: 26

## 2025-03-05 PROCEDURE — 74177 CT ABD & PELVIS W/CONTRAST: CPT | Mod: 26

## 2025-03-05 PROCEDURE — 99285 EMERGENCY DEPT VISIT HI MDM: CPT | Mod: FS

## 2025-03-05 RX ORDER — TAMSULOSIN HYDROCHLORIDE 0.4 MG/1
0.8 CAPSULE ORAL AT BEDTIME
Refills: 0 | Status: DISCONTINUED | OUTPATIENT
Start: 2025-03-05 | End: 2025-03-07

## 2025-03-05 RX ORDER — FLUVOXAMINE MALEATE 25 MG/1
100 TABLET, FILM COATED ORAL AT BEDTIME
Refills: 0 | Status: DISCONTINUED | OUTPATIENT
Start: 2025-03-05 | End: 2025-03-07

## 2025-03-05 RX ORDER — FINASTERIDE 1 MG/1
5 TABLET, FILM COATED ORAL DAILY
Refills: 0 | Status: DISCONTINUED | OUTPATIENT
Start: 2025-03-05 | End: 2025-03-07

## 2025-03-05 RX ORDER — MELATONIN 5 MG
3 TABLET ORAL AT BEDTIME
Refills: 0 | Status: DISCONTINUED | OUTPATIENT
Start: 2025-03-05 | End: 2025-03-07

## 2025-03-05 RX ORDER — FLUVOXAMINE MALEATE 25 MG/1
1 TABLET, FILM COATED ORAL
Refills: 0 | DISCHARGE

## 2025-03-05 RX ORDER — ENOXAPARIN SODIUM 100 MG/ML
40 INJECTION SUBCUTANEOUS EVERY 24 HOURS
Refills: 0 | Status: DISCONTINUED | OUTPATIENT
Start: 2025-03-06 | End: 2025-03-07

## 2025-03-05 RX ORDER — TAMSULOSIN HYDROCHLORIDE 0.4 MG/1
2 CAPSULE ORAL
Refills: 0 | DISCHARGE

## 2025-03-05 RX ORDER — ACETAMINOPHEN 500 MG/5ML
650 LIQUID (ML) ORAL EVERY 6 HOURS
Refills: 0 | Status: DISCONTINUED | OUTPATIENT
Start: 2025-03-05 | End: 2025-03-07

## 2025-03-05 RX ORDER — HYDROMORPHONE/SOD CHLOR,ISO/PF 2 MG/10 ML
0.5 SYRINGE (ML) INJECTION EVERY 4 HOURS
Refills: 0 | Status: DISCONTINUED | OUTPATIENT
Start: 2025-03-05 | End: 2025-03-07

## 2025-03-05 RX ORDER — DIPHENHYDRAMINE HCL 12.5MG/5ML
25 ELIXIR ORAL AT BEDTIME
Refills: 0 | Status: DISCONTINUED | OUTPATIENT
Start: 2025-03-05 | End: 2025-03-07

## 2025-03-05 RX ORDER — ATORVASTATIN CALCIUM 80 MG/1
80 TABLET, FILM COATED ORAL AT BEDTIME
Refills: 0 | Status: DISCONTINUED | OUTPATIENT
Start: 2025-03-05 | End: 2025-03-07

## 2025-03-05 RX ORDER — ONDANSETRON HCL/PF 4 MG/2 ML
4 VIAL (ML) INJECTION EVERY 6 HOURS
Refills: 0 | Status: DISCONTINUED | OUTPATIENT
Start: 2025-03-05 | End: 2025-03-07

## 2025-03-05 RX ORDER — FLUVOXAMINE MALEATE 25 MG/1
125 TABLET, FILM COATED ORAL DAILY
Refills: 0 | Status: DISCONTINUED | OUTPATIENT
Start: 2025-03-05 | End: 2025-03-07

## 2025-03-05 RX ORDER — ONDANSETRON HCL/PF 4 MG/2 ML
4 VIAL (ML) INJECTION ONCE
Refills: 0 | Status: COMPLETED | OUTPATIENT
Start: 2025-03-05 | End: 2025-03-05

## 2025-03-05 RX ORDER — FLUVOXAMINE MALEATE 25 MG/1
50 TABLET, FILM COATED ORAL
Refills: 0 | Status: DISCONTINUED | OUTPATIENT
Start: 2025-03-05 | End: 2025-03-07

## 2025-03-05 RX ORDER — HYDROMORPHONE/SOD CHLOR,ISO/PF 2 MG/10 ML
0.2 SYRINGE (ML) INJECTION EVERY 4 HOURS
Refills: 0 | Status: DISCONTINUED | OUTPATIENT
Start: 2025-03-05 | End: 2025-03-07

## 2025-03-05 RX ORDER — MAGNESIUM, ALUMINUM HYDROXIDE 200-200 MG
30 TABLET,CHEWABLE ORAL EVERY 4 HOURS
Refills: 0 | Status: DISCONTINUED | OUTPATIENT
Start: 2025-03-05 | End: 2025-03-07

## 2025-03-05 RX ORDER — HYDROMORPHONE/SOD CHLOR,ISO/PF 2 MG/10 ML
0.5 SYRINGE (ML) INJECTION ONCE
Refills: 0 | Status: DISCONTINUED | OUTPATIENT
Start: 2025-03-05 | End: 2025-03-05

## 2025-03-05 RX ADMIN — FLUVOXAMINE MALEATE 100 MILLIGRAM(S): 25 TABLET, FILM COATED ORAL at 21:23

## 2025-03-05 RX ADMIN — Medication 125 MILLILITER(S): at 16:49

## 2025-03-05 RX ADMIN — Medication 0.5 MILLIGRAM(S): at 16:50

## 2025-03-05 RX ADMIN — FINASTERIDE 5 MILLIGRAM(S): 1 TABLET, FILM COATED ORAL at 20:23

## 2025-03-05 RX ADMIN — Medication 4 MILLIGRAM(S): at 15:36

## 2025-03-05 RX ADMIN — Medication 1000 MILLILITER(S): at 14:56

## 2025-03-05 RX ADMIN — Medication 650 MILLIGRAM(S): at 20:12

## 2025-03-05 RX ADMIN — ATORVASTATIN CALCIUM 80 MILLIGRAM(S): 80 TABLET, FILM COATED ORAL at 21:23

## 2025-03-05 RX ADMIN — Medication 25 MILLIGRAM(S): at 21:25

## 2025-03-05 RX ADMIN — Medication 40 MILLIGRAM(S): at 15:10

## 2025-03-05 RX ADMIN — Medication 4 MILLIGRAM(S): at 14:57

## 2025-03-05 RX ADMIN — Medication 650 MILLIGRAM(S): at 20:42

## 2025-03-05 RX ADMIN — Medication 125 MILLILITER(S): at 20:20

## 2025-03-05 RX ADMIN — Medication 0.5 MILLIGRAM(S): at 16:23

## 2025-03-05 RX ADMIN — Medication 4 MILLIGRAM(S): at 14:56

## 2025-03-05 RX ADMIN — Medication 1000 MILLILITER(S): at 16:00

## 2025-03-05 RX ADMIN — TAMSULOSIN HYDROCHLORIDE 0.8 MILLIGRAM(S): 0.4 CAPSULE ORAL at 21:23

## 2025-03-05 NOTE — H&P ADULT - TIME BILLING
Extensive chart review  Extensive discussion with the patient  Emotional support and counseling provided regarding various aspects of the patient's care  Opportunity to ask questions was provided, all questions/concerns were addressed.

## 2025-03-05 NOTE — PATIENT PROFILE ADULT - NSTRANSFERBELONGINGSDISPO_GEN_A_NUR
Subjective     Stable, no new symptoms today.    Objective     I/O's    Intake/Output Summary (Last 24 hours) at 9/7/2023 1829  Last data filed at 9/7/2023 1642  Gross per 24 hour   Intake 400 ml   Output 350 ml   Net 50 ml       Last Recorded Vitals  Blood pressure 132/80, pulse 93, temperature 98.1 °F (36.7 °C), temperature source Oral, resp. rate 16, height 5' 9\" (1.753 m), weight 49.7 kg (109 lb 9.1 oz), SpO2 98 %.  Body mass index is 16.18 kg/m².  Review of Systems   Constitutional: Negative.    HENT: Negative.    Eyes: Negative.    Respiratory: Negative.    Cardiovascular: Negative.    Gastrointestinal: Negative.  Negative for abdominal pain, nausea and vomiting.   Genitourinary: Negative.    Musculoskeletal: Positive for falls. Negative for back pain and joint pain.   Skin: Negative.    Neurological: Negative.    Endo/Heme/Allergies: Negative.    Psychiatric/Behavioral: Negative.      Physical Exam  Vitals and nursing note reviewed.   Constitutional:       General: He is not in acute distress.     Appearance: Normal appearance. He is normal weight. He is not ill-appearing, toxic-appearing or diaphoretic.   HENT:      Head: Normocephalic and atraumatic.      Nose: Nose normal.      Neck: Normal range of motion and neck supple.   Eyes:      Extraocular Movements: Extraocular movements intact.      Pupils: Pupils are equal, round, and reactive to light.   Cardiovascular:      Rate and Rhythm: Normal rate and regular rhythm.      Pulses: Normal pulses.      Heart sounds: Normal heart sounds.   Pulmonary:      Effort: Pulmonary effort is normal.      Breath sounds: Normal breath sounds.   Abdominal:      General: Abdomen is flat. Bowel sounds are normal. There is no distension.      Palpations: Abdomen is soft. There is no mass.      Tenderness: There is no abdominal tenderness. There is no guarding or rebound.      Hernia: No hernia is present.   Genitourinary:     Comments: Left inguinal hernia  Musculoskeletal:          General: No tenderness. Normal range of motion.   Skin:     General: Skin is warm and dry.      Capillary Refill: Capillary refill takes 2 to 3 seconds.      Coloration: Skin is not jaundiced.      Findings: No bruising or erythema.   Neurological:      Mental Status: He is alert and oriented to person, place, and time. Mental status is at baseline.      Motor: Weakness (LUE 3/5) present.   Psychiatric:         Mood and Affect: Mood normal.         Behavior: Behavior normal.         Thought Content: Thought content normal.         Judgment: Judgment normal.         Labs     Last WBC:  WBC (K/mcL)   Date Value   09/07/2023 5.5     LAST RBC:  RBC (mil/mcL)   Date Value   09/07/2023 4.18 (L)     LAST HCT:  HCT (%)   Date Value   09/07/2023 35.5 (L)     LAST HGB:  HGB (g/dL)   Date Value   09/07/2023 11.8 (L)     LAST PLT:  PLT (K/mcL)   Date Value   09/07/2023 236     LAST SODIUM:  Sodium (mmol/L)   Date Value   09/07/2023 138     LAST POTASSIUM:  Potassium (mmol/L)   Date Value   09/07/2023 3.7     LAST CHLORIDE:  Chloride (mmol/L)   Date Value   09/07/2023 104     LAST GLUCOSE:  Glucose (mg/dL)   Date Value   09/07/2023 109 (H)     LAST CALCIUM:  Calcium (mg/dL)   Date Value   09/07/2023 8.7     LAST CO2:  Carbon Dioxide (mmol/L)   Date Value   09/07/2023 32     LAST BUN:  BUN (mg/dL)   Date Value   09/07/2023 21 (H)     LAST CREATININE:  Creatinine (mg/dL)   Date Value   09/07/2023 1.13     LAST MALBCR:  No results found for: \"MALBCR\"  LAST TROPONIN:  No results found for: \"TROP\"    LAST MICRO:  No results found for: \"MICRO\"    Imaging  LAST MRI:  === 09/06/23 ===    MRI BRAIN WO CONTRAST    - Narrative -  EXAM: MRI BRAIN WO CONTRAST    CLINICAL INDICATION: Neuro deficit, acute, stroke suspected, headaches,  dizziness, hypertension    COMPARISON: 9/6/2023    TECHNIQUE: Multiplanar, multisequential MRI imaging of the brain was  performed without contrast material in standard protocol.    FINDINGS: Exam is  limited by repetitive patient motion artifact. The  ventricles and sulci are moderately enlarged.  There are patchy foci of  FLAIR signal hyperintensity in the periventricular and subcortical white  matter. There is more focal FLAIR signal hyperintensity within the right  temporal occipital lobe and right thalamus, compatible with posterior  cerebral artery infarct. No intra-axial or extra-axial hemorrhage is  identified. Old small lacunar infarct and chronic ischemic changes of the  brainstem also noted.    The cerebellar tonsils show normal position and morphology.  The sella  turcica region is grossly unremarkable.  No masses are identified within  the regions of the internal auditory canals/cerebellopontine angles.  Expected central intracranial flow voids are present.  Visualized paranasal  sinuses are grossly clear. The mastoid air cells are grossly clear.    - Impression -  1.   Moderate atrophy and chronic deep white matter ischemic changes with  moderate sized acute infarct right temporal occipital lobe and  posterolateral thalamus in right posterior cerebral artery territory.    Electronically Signed by: GAVIN DOSHI M.D.  Signed on: 9/6/2023 4:42 PM  Workstation ID: [id number]    ___________________________________________________________________________    MRA HEAD WO CONTRAST    - Narrative -  EXAM: MRA HEAD WO CONTRAST    CLINICAL INDICATION: Neuro deficit, acute, stroke suspected    COMPARISON: None available.    TECHNIQUE: Non-contrast 3-dimensional time of flight MRA imaging of the  intracranial arterial system was performed in standard protocol. Multiple  3-dimensional reformatted images were produced.    FINDINGS: The intracranial portions of the internal carotid arteries are  symmetrically patent, without aneurysm, significant focal stenosis, or  focal occlusion identified. The middle cerebral arteries are patent,  without significant focal stenosis or aneurysm. The anterior  cerebral  arteries are unremarkable. Right posterior cerebral artery appears occluded  with partial occlusion of the right posterior communicating artery also  noted. Left posterior cerebral artery shows fetal origin. There is a  hypoplastic left distal vertebral artery terminating in the posterior  inferior cerebellar artery.    - Impression -  1.   Partial occlusion of right posterior communicating artery with  complete occlusion of right posterior cerebral artery noted. This  corresponds with region of moderate sized acute infarct on MRI.    Electronically Signed by: GAVIN DOSHI M.D.  Signed on: 9/6/2023 4:46 PM  Workstation ID: [id number]    ___________________________________________________________________________    MRA NECK W WO CONTRAST    - Narrative -  EXAM: MRA NECK W WO CONTRAST    CLINICAL INDICATION: Neuro deficit, acute, stroke suspected, acute right  temporal occipital infarct    COMPARISON: None available.    TECHNIQUE: Non-contrast 2-dimensional time of flight MRA imaging of the  neck arterial system was performed in standard protocol. Additional post  contrast enhanced images were also obtained following administration of 10  mL intravenous MultiHance. Multiple 3-dimensional reformatted images were  produced.    FINDINGS: The common carotid arteries and internal carotid arteries are  patent, without significant focal stenosis or occlusive disease identified.  External carotid arteries are patent. Vertebral arteries are patent,  without significant focal stenosis or occlusive disease identified.    - Impression -  1.   No focal stenosis or occlusion identified throughout the neck arterial  system.    Electronically Signed by: GAVIN DOSHI M.D.  Signed on: 9/6/2023 4:44 PM  Workstation ID: [id number]  LAST EKG:  Encounter Date: 09/06/23   Electrocardiogram 12-Lead   Result Value    Ventricular Rate EKG/Min (BPM) 94    Atrial Rate (BPM) 202    QRS-Interval (MSEC) 110    QT-Interval  (MSEC) 406    QTc 508    R Axis (Degrees) 76    T Axis (Degrees) 38    REPORT TEXT      Atrial fibrillation  with premature ventricular or aberrantly conducted complexes  Incomplete left bundle branch block  Minimal voltage criteria for LVH, may be normal variant  Nonspecific ST abnormality  , probably digitalis effect  Prolonged QT  Abnormal ECG  When compared with ECG of  06-SEP-2023 05:39,  Atrial fibrillation  has replaced  Sinus rhythm  Confirmed by HILDA TOWNSEND DO ([id number]) on 9/7/2023 5:28:30 AM         Assessment & Plan   Principal Problem:    Acute CVA (cerebrovascular accident) (CMD)  Active Problems:    Fall    PAF (paroxysmal atrial fibrillation) (CMD)    CAD (coronary artery disease)    Type 2 diabetes mellitus with hyperglycemia (CMD)    Hypokalemia    Chronic severe protein-calorie malnutrition (CMS/HCC)    Weakness    Will consult EP cardiology regarding Watchman procedure.   PT/OT/ST in progress.   Resume home medications except anticoagulants and BP meds.   Will need rehab.  D/w the patient in details.    Smoking Cessation  Counseling given: Not Answered       DVT Prophylaxis  SCD    Code Status    Code Status: Selective Treatment/DNR    Primary Care Physician  Stanley Tejeda MD   not applicable

## 2025-03-05 NOTE — PATIENT PROFILE ADULT - FALL HARM RISK - HARM RISK INTERVENTIONS

## 2025-03-05 NOTE — H&P ADULT - PROBLEM/PLAN-2
Patient vital signs throughout shift. Up to bathroom with assist. No signs of nausea, vomiting, or itchiness.    DISPLAY PLAN FREE TEXT

## 2025-03-05 NOTE — ED PROVIDER NOTE - PROGRESS NOTE DETAILS
Spoke to surgery Tanya WANG who will consult pt in ED. Pt seen by surgery KATHLEEN Martínez who spoke to surgeon, Dr. Alexander and advised can place NGT for pt and admit to medicine. no acute surgical intervention at this time as per surgery.   Spoke to Dr. VERNA Cardoza, covering for Dr. Lmaas who accepted admission.

## 2025-03-05 NOTE — H&P ADULT - HISTORY OF PRESENT ILLNESS
This is a 74-year-old male with history of paroxysmal A-fib status post ablation, CAD with stent, on Eliquis, BPH, hyperlipidemia, ADD presents with complaint of abdominal pain today.  Patient states that he started having diffuse abdominal pain 10 AM this morning.  He states that pain is diffuse, more localized to mid abdomen, constant and nonradiating with associated nausea and mild chills. He states abdomen feels slightly distended. He states that he had a normal bowel movement today.  He states that he has history of diverticulitis and also has been on Wegovy for weight loss x 3 months. His dosage was increased 3 weeks ago to 0.5mg. His last dose was on 3/2/25. He denies fever, chest pain, shortness of breath, recent travel, known sick contacts, urinary symptoms, diarrhea/constipation, history of abdominal surgeries, or vomiting.

## 2025-03-05 NOTE — ED PROVIDER NOTE - OBJECTIVE STATEMENT
74-year-old male with history of paroxysmal A-fib status post ablation, CAD with stent, on Eliquis, BPH, hyperlipidemia presents with complaint of abdominal pain today.  Patient states that started having diffuse abdominal pain 10 AM this morning.  States that pain is diffuse, more localized to mid abdomen, constant and nonradiating with associated nausea and mild chills. States abdomen feels slightly distended. States that he had a normal bowel movement today.  States that he has history of diverticulitis and also has been on Wegovy x 3 months, last had dosage increased 1 month ago. Denies fever, chest pain, shortness of breath, recent travel, known sick contacts, urinary symptoms, diarrhea/constipation, history of abdominal surgeries.

## 2025-03-05 NOTE — PATIENT PROFILE ADULT - HOME ACCESSIBILITY CONCERNS
Patient states on Eliquis,  Ablation 2 weeks ago, states abd pain and vaginal bleeding onset 2 days ago. States 2 pads per hour, worse with standing up.    none

## 2025-03-05 NOTE — ED ADULT NURSE REASSESSMENT NOTE - REASSESS COMMUNICATION
pt sitting on edge of stretcher, +belching, reporting the pain medication did not help at all/ED physician notified

## 2025-03-05 NOTE — ED ADULT NURSE NOTE - OBJECTIVE STATEMENT
pt arrives to ED with c/o upper abd pain that was sudden onset around 10am this morning. pt reports he ate breakfast uneventfully. +nausea, no vomiting. pain is described as a constant cramping. had a normal formed BM today. pt adds he has been on Wegovy for about 3 months.

## 2025-03-05 NOTE — ED PROVIDER NOTE - CLINICAL SUMMARY MEDICAL DECISION MAKING FREE TEXT BOX
Patient is a 74-year-old male who presents to the emergency room with abdominal pain.  Past medical history of paroxysmal A-fib status post ablation CAD with stent on Eliquis BPH hyperlipidemia.  Patient reports that around 10 AM after having a meal he developed diffuse abdominal pain more localized in the mid abdomen not radiating associated with nausea mild chills and increased belching.  Reports his abdomen feels distended.  He did have a normal bowel movement after the pain started with no improvement in symptoms.  Also reports a history of diverticulitis with this feels different.  Further notes he has been on Wegovy for the last 3 months last dose increased 1 month ago but has never had similar symptoms.  Denies any fevers chest pain shortness of breath pleuritic chest symptoms.  On exam patient is lying in bed no acute distress normocephalic atraumatic pupils equal round and reactive hearts regular rate lungs clear to auscultation abdomen mildly distended but soft mild diffuse tenderness to palpation no guarding or rebound hypoactive bowel sounds noted.  Patient's presenting to the emergency room with abdominal pain.  Differential is broad and includes possible medication reaction versus pancreatitis versus gastritis versus cholecystitis versus bowel obstruction.  Will obtain screening labs CT imaging hydrate medicate as needed for symptoms and monitor.  Ultimate clinical disposition will be pending results.  Independent review of EKG reveals a sinus rhythm sinus arrhythmia at 83 bpm first-degree AV block.  Independent review of CT imaging reveals moderate distal small bowel obstruction.  Surgery consulted.

## 2025-03-05 NOTE — ED ADULT NURSE NOTE - NSHOSCREENINGQ1_ED_ALL_ED
2/4/20      Assessment:  No pain reported in the neck but still having LB pain.     Plan for Next Session:   trial some manual cervical traction, work on thoracic mobility to tolerance, soft tissue prn, check on HEP and adjust prn, advance strength work slowly and work on endurance of cerv and thor regions, pain control prn          Time In / Time Out: 930-1040        Timed Code/Total Treatment Minutes:   30' manual, 25' TE, 15' ES/ 79'      Next Progress Note due:  Visit 10      Plan of Care Interventions:  [x] Therapeutic Exercise  [x] Modalities:  [x] Therapeutic Activity     [] Ultrasound  [x] Estim  [] Gait Training      [] Cervical Traction [] Lumbar Traction  [x] Neuromuscular Re-education    [x] Cold/hotpack [] Iontophoresis   [x] Instruction in HEP      [] Vasopneumatic   [] Dry Needling    [x] Manual Therapy               [] Aquatic Therapy              Electronically signed by:  Shakeel Toro, RADHA  2/21/2020, 9:30 AM No

## 2025-03-05 NOTE — ED PROVIDER NOTE - DIFFERENTIAL DIAGNOSIS
Differential Diagnosis Patient's presenting to the emergency room with abdominal pain.  Differential is broad and includes possible medication reaction versus pancreatitis versus gastritis versus cholecystitis versus bowel obstruction.  Will obtain screening labs CT imaging hydrate medicate as needed for symptoms and monitor.  Ultimate clinical disposition will be pending results.

## 2025-03-05 NOTE — H&P ADULT - PROBLEM SELECTOR PLAN 1
Ad[facility]   [facility]  NPO  NGT  IVF  IV PPI   Pain meds prn  Serial abdominal exams  Monitor labs  D/C [place]  Surgery/GI consults  Further work-up/management pending clinical course.

## 2025-03-05 NOTE — ED ADULT NURSE NOTE - NSFALLHARMRISKINTERV_ED_ALL_ED

## 2025-03-05 NOTE — ED ADULT TRIAGE NOTE - CHIEF COMPLAINT QUOTE
" I started to develop abdominal pain at around 10 am today " (+) Nausea No vomiting or diarrhea  Pt on Wegovy x 3 months now

## 2025-03-05 NOTE — PATIENT PROFILE ADULT - LEGAL HELP
no [4 x 4] : 4 x 4  [Abdominal Pad] : Abdominal Pad [Normal Breath Sounds] : Normal breath sounds [Normal Heart Sounds] : normal heart sounds [2+] : left 2+ [0] : left 0 [Ankle Swelling (On Exam)] : present [Ankle Swelling Bilaterally] : severe [Varicose Veins Of Lower Extremities] : bilaterally [Alert] : alert [Oriented to Person] : oriented to person [Calm] : calm [JVD] : no jugular venous distention  [de-identified] : WD/WN in no acute distress. Morbidly obese. [de-identified] : WNL [de-identified] : CHRISL [de-identified] : WNL [de-identified] : Right plantar DFU is clean, base is red and viable, no drainage, no acute infection, periwound skin is intact with no cellulitis. [de-identified] : Circ neurovascular function WNL post Ace wraps, pt expressed comfort.\par Apligraf 44 sq cm\par 75% Used 25% Discarded\par [id number]\par Exp: 03/09/2021\par Lot: [id number]\par pH 7.3-7.5\par  [FreeTextEntry1] : Plantar Foot 1 st met [FreeTextEntry2] : 4.2 [FreeTextEntry3] : 1.9 [FreeTextEntry4] : 0.1-0.2 [de-identified] : serosanguineous [de-identified] : 0.2-0.4 cm @ 8-9 o' clock [de-identified] : callus [de-identified] : 90-95% [de-identified] : 5-10% [de-identified] : Apligraf 44 sq cm- 75% Used 25% Discarded [de-identified] :  to hold dressing in place [de-identified] : Adaptic touch and Calcium Alginate [de-identified] : Cleansed with Normal saline\par Reconstituted in NS\par Lot -[id number]\par Exp: 03/01/2023\par \par \par  [TWNoteComboBox1] : Right [TWNoteComboBox4] : Small [TWNoteComboBox5] : No [TWNoteComboBox6] : Surgical [de-identified] : Yes [de-identified] : other [de-identified] : None [de-identified] : None [de-identified] : >75% [de-identified] : Yes [de-identified] : 2.5% Lidocaine Topical [TWNoteComboBox7] : Arlin [de-identified] : Application of skin substitute [de-identified] : Ace wraps [de-identified] : Weekly [de-identified] : Primary Dressing

## 2025-03-06 LAB
ALBUMIN SERPL ELPH-MCNC: 3.4 G/DL — SIGNIFICANT CHANGE UP (ref 3.3–5)
ALP SERPL-CCNC: 109 U/L — SIGNIFICANT CHANGE UP (ref 30–120)
ALT FLD-CCNC: 25 U/L — SIGNIFICANT CHANGE UP (ref 10–60)
AMYLASE P1 CFR SERPL: 37 U/L — SIGNIFICANT CHANGE UP (ref 25–125)
ANION GAP SERPL CALC-SCNC: 5 MMOL/L — SIGNIFICANT CHANGE UP (ref 5–17)
AST SERPL-CCNC: 18 U/L — SIGNIFICANT CHANGE UP (ref 10–40)
BILIRUB DIRECT SERPL-MCNC: 0.2 MG/DL — SIGNIFICANT CHANGE UP (ref 0–0.3)
BILIRUB INDIRECT FLD-MCNC: 0.8 MG/DL — SIGNIFICANT CHANGE UP (ref 0.2–1)
BILIRUB SERPL-MCNC: 1 MG/DL — SIGNIFICANT CHANGE UP (ref 0.2–1.2)
BUN SERPL-MCNC: 15 MG/DL — SIGNIFICANT CHANGE UP (ref 7–23)
CALCIUM SERPL-MCNC: 8.7 MG/DL — SIGNIFICANT CHANGE UP (ref 8.4–10.5)
CHLORIDE SERPL-SCNC: 103 MMOL/L — SIGNIFICANT CHANGE UP (ref 96–108)
CO2 SERPL-SCNC: 31 MMOL/L — SIGNIFICANT CHANGE UP (ref 22–31)
CREAT SERPL-MCNC: 1.22 MG/DL — SIGNIFICANT CHANGE UP (ref 0.5–1.3)
EGFR: 63 ML/MIN/1.73M2 — SIGNIFICANT CHANGE UP
EGFR: 63 ML/MIN/1.73M2 — SIGNIFICANT CHANGE UP
GLUCOSE SERPL-MCNC: 91 MG/DL — SIGNIFICANT CHANGE UP (ref 70–99)
HCT VFR BLD CALC: 42.7 % — SIGNIFICANT CHANGE UP (ref 39–50)
HGB BLD-MCNC: 15 G/DL — SIGNIFICANT CHANGE UP (ref 13–17)
LIDOCAIN IGE QN: 20 U/L — SIGNIFICANT CHANGE UP (ref 16–77)
MAGNESIUM SERPL-MCNC: 1.7 MG/DL — SIGNIFICANT CHANGE UP (ref 1.6–2.6)
MCHC RBC-ENTMCNC: 33 PG — SIGNIFICANT CHANGE UP (ref 27–34)
MCHC RBC-ENTMCNC: 35.1 G/DL — SIGNIFICANT CHANGE UP (ref 32–36)
MCV RBC AUTO: 93.8 FL — SIGNIFICANT CHANGE UP (ref 80–100)
NRBC BLD AUTO-RTO: 0 /100 WBCS — SIGNIFICANT CHANGE UP (ref 0–0)
PLATELET # BLD AUTO: 123 K/UL — LOW (ref 150–400)
POTASSIUM SERPL-MCNC: 4.2 MMOL/L — SIGNIFICANT CHANGE UP (ref 3.5–5.3)
POTASSIUM SERPL-SCNC: 4.2 MMOL/L — SIGNIFICANT CHANGE UP (ref 3.5–5.3)
PROT SERPL-MCNC: 7.3 G/DL — SIGNIFICANT CHANGE UP (ref 6–8.3)
RBC # BLD: 4.55 M/UL — SIGNIFICANT CHANGE UP (ref 4.2–5.8)
RBC # FLD: 12.9 % — SIGNIFICANT CHANGE UP (ref 10.3–14.5)
SODIUM SERPL-SCNC: 139 MMOL/L — SIGNIFICANT CHANGE UP (ref 135–145)
WBC # BLD: 5.51 K/UL — SIGNIFICANT CHANGE UP (ref 3.8–10.5)
WBC # FLD AUTO: 5.51 K/UL — SIGNIFICANT CHANGE UP (ref 3.8–10.5)

## 2025-03-06 PROCEDURE — 74250 X-RAY XM SM INT 1CNTRST STD: CPT | Mod: 26

## 2025-03-06 PROCEDURE — 99223 1ST HOSP IP/OBS HIGH 75: CPT

## 2025-03-06 RX ORDER — DIATRIZOATE MEGLUMINE, SODIUM 66 %-10 %
100 VIAL (ML) INJECTION ONCE
Refills: 0 | Status: DISCONTINUED | OUTPATIENT
Start: 2025-03-06 | End: 2025-03-07

## 2025-03-06 RX ADMIN — Medication 40 MILLIGRAM(S): at 10:35

## 2025-03-06 RX ADMIN — FLUVOXAMINE MALEATE 100 MILLIGRAM(S): 25 TABLET, FILM COATED ORAL at 21:23

## 2025-03-06 RX ADMIN — ENOXAPARIN SODIUM 40 MILLIGRAM(S): 100 INJECTION SUBCUTANEOUS at 10:35

## 2025-03-06 RX ADMIN — FINASTERIDE 5 MILLIGRAM(S): 1 TABLET, FILM COATED ORAL at 13:33

## 2025-03-06 RX ADMIN — FLUVOXAMINE MALEATE 125 MILLIGRAM(S): 25 TABLET, FILM COATED ORAL at 09:55

## 2025-03-06 RX ADMIN — Medication 100 MILLILITER(S): at 09:29

## 2025-03-06 RX ADMIN — ATORVASTATIN CALCIUM 80 MILLIGRAM(S): 80 TABLET, FILM COATED ORAL at 21:23

## 2025-03-06 RX ADMIN — FLUVOXAMINE MALEATE 50 MILLIGRAM(S): 25 TABLET, FILM COATED ORAL at 13:33

## 2025-03-06 RX ADMIN — Medication 650 MILLIGRAM(S): at 06:24

## 2025-03-06 RX ADMIN — Medication 650 MILLIGRAM(S): at 05:54

## 2025-03-06 RX ADMIN — TAMSULOSIN HYDROCHLORIDE 0.8 MILLIGRAM(S): 0.4 CAPSULE ORAL at 21:23

## 2025-03-06 NOTE — CARE COORDINATION ASSESSMENT. - SOURCES OF SUPPORT FOR PATIENT
Impression: Dry eye syndrome of bilateral lacrimal glands: H04.123. Plan: Discussed dry eye diagnosis in detail. Recommend Systane Complete QID OU. Discussed prescription medication options such as Restasis and Annalee Ric in the future. Also discussed potential of punctal plugs/punctal cautery. spouse

## 2025-03-06 NOTE — CHART NOTE - NSCHARTNOTEFT_GEN_A_CORE
GENERAL SURGERY FOLLOW UP    SMALL BOWEL SERIES WITH GASTROGRAFIN REVIEWED WITH RADIOLOGIST- CONTRAST IN COLON, NO SMALL BOWEL OBSTRUCTION  NO FLATUS / BM YET  TOLERATING ALL PO MEDICATIONS  NO N/V, ABDOMINAL PAIN  ABDOMINAL EXAM REMAINS BENIGN  WILL REMOVE NGT  SIPS OF CLEAR, ADVISED IF NAUSEOUS STOP PO INTAKE   MONITOR FOR GI FUNCTION AND DIET TOLERANCE  ENCOURAGE OOB, AMBULATE  PLAN DISCUSSED WITH PATIENT AND DR. MCKEON

## 2025-03-06 NOTE — CARE COORDINATION ASSESSMENT. - MET/SPOKE WITH
Pt. 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  States spouse is visiting today and will transport him home when ready./patient

## 2025-03-06 NOTE — CARE COORDINATION ASSESSMENT. - NSCAREPROVIDERS_GEN_ALL_CORE_FT
CARE PROVIDERS:  Accepting Physician: Tj Cardoza  Administration: Warner Leslie  Admitting: Forrest Lamas  Attending: Forrest Lamas  Case Management: Sharifa Coffman  Consultant: Colt Batres  Consultant: Josr Lezama  Consultant: Niyah Terrazas  Consultant: Rick Alexander  Consultant: Tanya Barragan  Covering Team: Tj Cardoza  ED ACP: Ashwini Alvardao  ED Attending: Ngozi Ferrara  ED Nurse: Joie Thurman  ED Nurse 2: Judi Donahue  Infection Control: Felicity Maynard  Nurse: Tram Amado  Nurse: Adam, Merline  Ordered: Physician, Ordering  Outpatient Provider: Víctor Kim  Override: Kasia Whitley  Primary Team: Brent Barron  Primary Team: Eun Correia  Primary Team: Mello Solares  Respiratory Therapy: Josr Dunbar  : Alyssa Corbett// Supp. Assoc.: Demetrice Thomas

## 2025-03-06 NOTE — CARE COORDINATION ASSESSMENT. - NSPASTMEDSURGHISTORY_GEN_ALL_CORE_FT
PAST MEDICAL & SURGICAL HISTORY:  Hyperlipemia      BPH (benign prostatic hyperplasia)  S/P TURPx 2 in past.      CAD (coronary artery disease)  s/p stent placment.      Stented coronary artery      Hyperlipemia      Afib  S/P Ablation in past.

## 2025-03-06 NOTE — CARE COORDINATION ASSESSMENT. - NSDCPLANSERVICES_GEN_ALL_CORE
74 year old male with a history of HL, CAD s/p PCI, atrial fibrillation s/p ablation, atrial tachycardia s/p ablation, BPH, ADD who presented with abdominal pain. He noted worsening diffuse abdominal discomfort and nausea. No chest pain or shortness of breath. His Wegovy dose was recently increased.    Per treatment team rounds pt has a bowel obstruction and an NGT to suction, IVF and cardio/ GI following.      CM met with pt. at bedside and introduced self and role of CM to assist with transition planning and provide CM contact information and discharge planning resource folder.  Pt. states he lives with spouse benjie home with 2 steps to enter from garage and flight to bedroom . States PTA he works , drives , owns no DME and is independent and does not need a caregiver.  Wife Adri is HCP and available to transport him home and assist if needed.  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  Pt. is self directing and likely has no skilled needs for home care services.    PCP Dr. Levi Kim [phone]  Cardio Dr Fermin Hicks [facility] [phone] and Thee Gutierrez at [place]  Pharmacy is [facility] in [place] [phone].  Pt. verbalizing good understanding and states he does not feel he will need any home care services. Pt. will follow up with treatment plan post acute , with d/c instructions to be provided by primary nurse at time of discharge.  CM remains available if needed./Anticipated Needs Unclear at Present/No Anticipated Discharge Needs

## 2025-03-06 NOTE — CONSULT NOTE ADULT - ASSESSMENT
SBO without transition point  Afib on Eliquis    Recommendations:  - NPO  - NGT to intermittent suction  - PPI 40mg IV QD  - Avoid opiates please   - Gastrograffin challenge today  - f/u Surgery recs  - Serial abdominal exams  - IVF  - Will follow with you    Josr Lezama M.D.  [facility]  [phone]  
The patient is a 74 year old male with a history of HL, CAD s/p PCI, atrial fibrillation s/p ablation, atrial tachycardia s/p ablation, BPH, ADD who presented with abdominal pain.    Plan:  - ECG with sinus rhythm and no evidence of ischemia/infarction  - Cardiac enzymes negative  - CT A/P with distal SBO  - CXR with no active chest disease  - Apixaban on hold in case surgery is needed; if no plans for surgery will resume  - Hold atorvastatin while NPO  - NGT in place  - Surgery follow-up  - If plan includes urgent surgery, the patient is optimized to proceed from a cardiac standpoint

## 2025-03-06 NOTE — PROGRESS NOTE ADULT - PROBLEM SELECTOR PLAN 1
Admit   Possibly 2/2 to Wegovy  NPO  NGT  IVF  IV PPI   Pain meds prn  Serial abdominal exams  Monitor labs  D/C [place]  Surgery/GI consults  Further work-up/management pending clinical course.

## 2025-03-06 NOTE — CONSULT NOTE ADULT - SUBJECTIVE AND OBJECTIVE BOX
History of Present Illness: The patient is a 74 year old male with a history of HL, CAD s/p PCI, atrial fibrillation s/p ablation, atrial tachycardia s/p ablation, BPH, ADD who presented with abdominal pain. He noted worsening diffuse abdominal discomfort and nausea. No chest pain or shortness of breath. His Wegovy dose was recently increased.    Past Medical/Surgical History:  HL, CAD s/p PCI, atrial fibrillation s/p ablation, atrial tachycardia s/p ablation, BPH, ADD    Medications:  Home Medications:  Eliquis 5 mg oral tablet: 1 tab(s) orally 2 times a day (05 Mar 2025 19:18)  Flomax 0.4 mg oral capsule: 2 cap(s) orally once a day (at bedtime) (05 Mar 2025 19:21)  fluvoxaMINE 100 mg oral tablet: 1 tab(s) orally 2 times a day in the morning and in the evening (05 Mar 2025 19:21)  fluvoxaMINE 25 mg oral tablet: 1 tab(s) orally once a day in the morning (100mg + 25mg = total 125mg in the morning) (05 Mar 2025 19:21)  fluvoxaMINE 50 mg oral tablet: 1 tab(s) orally once a day in the afternoon (05 Mar 2025 19:21)  Lipitor 80 mg oral tablet: 1 tab(s) orally once a day (05 Mar 2025 14:34)  Proscar 5 mg oral tablet: 1 tab(s) orally once a day (at bedtime) (05 Mar 2025 14:34)  Wegovy (0.5 mg dose) subcutaneous solution: 0.5 milligram(s) subcutaneously once a week (05 Mar 2025 19:22)      Family History: Non-contributory family history of premature cardiovascular atherosclerotic disease    Social History: No tobacco, alcohol or drug use    Review of Systems:  General: No fevers, chills, weight gain  Skin: No rashes, color changes  Cardiovascular: No chest pain, orthopnea  Respiratory: No shortness of breath, cough  Gastrointestinal: +nausea, abdominal pain  Genitourinary: No incontinence, pain with urination  Musculoskeletal: No pain, swelling, decreased range of motion  Neurological: No headache, weakness  Psychiatric: No depression, anxiety  Endocrine: No weight gain, increased thirst  All other systems are comprehensively negative.    Physical Exam:  Vitals:        Vital Signs Last 24 Hrs  T(C): 36.6 (06 Mar 2025 07:46), Max: 36.7 (05 Mar 2025 14:01)  T(F): 97.9 (06 Mar 2025 07:46), Max: 98.1 (05 Mar 2025 23:25)  HR: 80 (06 Mar 2025 07:46) (77 - 85)  BP: 127/83 (06 Mar 2025 07:46) (122/72 - 130/84)  BP(mean): --  RR: 17 (06 Mar 2025 07:46) (17 - 20)  SpO2: 96% (06 Mar 2025 07:46) (92% - 98%)    Parameters below as of 06 Mar 2025 07:46  Patient On (Oxygen Delivery Method): room air      General: NAD  HEENT: MMM  Neck: No JVD, no carotid bruit  Lungs: CTAB  CV: RRR, nl S1/S2, no M/R/G  Abdomen: S/NT/ND, +BS  Extremities: No LE edema, no cyanosis  Neuro: AAOx3, non-focal  Skin: No rash    Labs:                        15.0   5.51  )-----------( 123      ( 06 Mar 2025 07:57 )             42.7     03-06    139  |  103  |  15  ----------------------------<  91  4.2   |  31  |  1.22    Ca    8.7      06 Mar 2025 07:57  Mg     1.7     03-06    TPro  7.3  /  Alb  3.4  /  TBili  1.0  /  DBili  0.2  /  AST  18  /  ALT  25  /  AlkPhos  109  03-06        PT/INR - ( 05 Mar 2025 14:35 )   PT: 16.6 sec;   INR: 1.41 ratio         PTT - ( 05 Mar 2025 14:35 )  PTT:36.6 sec    ECG/Telemetry: NSR, 1st deg AVB, normal axis, no ST abnormality, PVC
SURGERY PA CONSULT NOTE:    CHIEF COMPLAINT:  Patient is a 74y old  Male who presents with a chief complaint of abdominal pain since this AM.     HPI: 75 yo male with hx of PAfib s/p ablation, CAD with stent 2009, BPH s/p laser TURP x 2, Prostates artery embolization, HLD, Diverticulitis, presents to ED c/o diffuse abdominal pain since 10:15am. The pain was continuous, dull, achy with mild chills, nausea and retching.  He felt urge to go for BM, and had a normal bowel movement around 10:30am with some improvement of pain.  However the pain came back again with burping, nausea and retching. Endorses his abdomen is more distended than usual.  Pt denies hx of abdominopelvic surgery in the past. 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  He started Wegovy 3 months go with recent dose increase 1 month ago.  He has never had similar experience in the past.  Last Colonoscopy was in 6/2024 had polypectomy, otherwise told normal. ECG during the same visit, was told hiatal hernia and reflux.  Denies fever, vomiting, chest pain, palpitations, SOB, dyspnea, dysuria, hematuria, melena, hematochezia, recent travel, recent sick contact, recent trauma/Injury.          PAST MEDICAL HISTORY:  PAST MEDICAL & SURGICAL HISTORY:  CAD (coronary artery disease)  s/p stent placment.      BPH (benign prostatic hyperplasia)  S/P TURPx 2 in past.      Hyperlipemia      Afib  S/P Ablation in past.      Hyperlipemia      Stented coronary artery          PAST SURGICAL HISTORY:    REVIEW OF SYSTEMS:  General/Constitutional: No acute distress  HEENT: Denies auditory or visual changes/disturbances.   Neck: Denies neck pain/stiffness.   Respiratory: Denies cough/hemoptysis  Cardiac: Denies chest pain, palpitations  Abdomen: Endorses abdominal pain with distention, nausea, burping.   Extremities: Denies swelling  Genitourinary: Denies dysuria/hematuria  Neuro: Denies weakness  Skin: Denies pruritus, xrashes  Psych: Denies hallucinations, visual disturbances, or depression    MEDICATIONS:  Home Medications:  acetaminophen 325 mg oral tablet: 2 tab(s) orally every 6 hours, As needed, Mild Pain (1 - 3) (05 Mar 2025 14:34)  Eliquis 5 mg oral tablet: 1 tab(s) orally 2 times a day.    Resume only when cleared by cardiology and urology.  (05 Mar 2025 14:34)  Flomax: 0.8 milligram(s) orally once a day (at bedtime) (05 Mar 2025 14:34)  Lipitor 80 mg oral tablet: 1 tab(s) orally once a day (05 Mar 2025 14:34)  Proscar 5 mg oral tablet: 1 tab(s) orally once a day (at bedtime) (05 Mar 2025 14:34)  Toprol-XL: 75 milligram(s) orally 2 times a day (05 Mar 2025 14:34)  Wegovy (0.5 mg dose) subcutaneous solution: 0.5 milligram(s) subcutaneously once a week (05 Mar 2025 14:34)    MEDICATIONS  (STANDING):  sodium chloride 0.9%. 1000 milliLiter(s) (125 mL/Hr) IV Continuous <Continuous>    MEDICATIONS  (PRN):      ALLERGIES:  Allergies    penicillin (Rash)    Intolerances        SOCIAL HISTORY:  Social History:    Smoking: Yes [ ]  No [x ]   ______pk yrs  ETOH  Yes [ ]  No [x ]  Social [ ]  DRUGS:  Yes [ ]  No [x ]  if so what______________    FAMILY HISTORY:  FAMILY HISTORY:      VITAL SIGNS:  Vital Signs Last 24 Hrs  T(C): 36.7 (05 Mar 2025 14:01), Max: 36.7 (05 Mar 2025 14:01)  T(F): 98 (05 Mar 2025 14:01), Max: 98 (05 Mar 2025 14:01)  HR: 85 (05 Mar 2025 14:01) (85 - 85)  BP: 130/84 (05 Mar 2025 14:01) (130/84 - 130/84)  BP(mean): --  RR: 20 (05 Mar 2025 14:01) (20 - 20)  SpO2: 98% (05 Mar 2025 14:01) (98% - 98%)    Parameters below as of 05 Mar 2025 14:01  Patient On (Oxygen Delivery Method): room air        PHYSICAL EXAM:  General: Obese male, no acute distress.  Head, Eyes, Ears, Nose, Throat:  anicteric, conjunctiva-non injected and moist  Neck: Supple  Chest: CTA bilaterally.   Heart: Heart rhythm regular  Abdomen: Soft, moderately distended, ventral hernia easily reducible, Non-tender (received dilaudid, morphine) at this time.  No guarding, rebound, and no peritoneal signs.    Extremity: No swelling  Neuro: Alert and oriented x3, motor and sensory intact  Psychiatric: Awake , alert, oriented x3 with an appropriate affect.   Skin: clean, dry.      LABS:                        15.4   7.03  )-----------( 128      ( 05 Mar 2025 14:35 )             43.3     03-05    139  |  102  |  17  ----------------------------<  126[H]  4.0   |  29  |  1.17    Ca    9.6      05 Mar 2025 14:35    TPro  8.3  /  Alb  4.1  /  TBili  1.0  /  DBili  x   /  AST  20  /  ALT  30  /  AlkPhos  109  03-05    PT/INR - ( 05 Mar 2025 14:35 )   PT: 16.6 sec;   INR: 1.41 ratio         PTT - ( 05 Mar 2025 14:35 )  PTT:36.6 sec  Urinalysis Basic - ( 05 Mar 2025 14:35 )    Color: x / Appearance: x / SG: x / pH: x  Gluc: 126 mg/dL / Ketone: x  / Bili: x / Urobili: x   Blood: x / Protein: x / Nitrite: x   Leuk Esterase: x / RBC: x / WBC x   Sq Epi: x / Non Sq Epi: x / Bacteria: x      LIVER FUNCTIONS - ( 05 Mar 2025 14:35 )  Alb: 4.1 g/dL / Pro: 8.3 g/dL / ALK PHOS: 109 U/L / ALT: 30 U/L / AST: 20 U/L / GGT: x               RADIOLOGY & ADDITIONAL STUDIES:  < from: CT Abdomen and Pelvis w/ IV Cont (03.05.25 @ 16:04) >    FINDINGS:  LOWER CHEST: Cardiomegaly and coronary artery calcifications. Small   sliding-type hiatal hernia, fluid-filled probably reflux in this patient.    LIVER: Within normal limits.  BILE DUCTS: Normal caliber.  GALLBLADDER: Within normal limits.  SPLEEN: Within normal limits.  PANCREAS: Within normal limits.  ADRENALS: Within normal limits.  KIDNEYS/URETERS: No hydronephrosis or acute findings. There is a small   less than 1 cm low-attenuation lesion lateral mid right kidney. This is   too small to further characterize but most likely represents a benign   cyst of no significance.    BLADDER: Within normal limits.  REPRODUCTIVE ORGANS: Moderate enlargement of the prostate.    BOWEL:  -Moderately dilated loops of small bowel to the right lower quadrant   where there are decompressed loops compatible with small bowel   obstruction. No definite transition point identified.  -The stomach is substantially distended and filled with fluid, air, and   food material.  -Moderate diverticulosis throughout the entire colon with no evidence of   acute diverticulitis.  -Normal appendix.  PERITONEUM/RETROPERITONEUM: Within normal limits.  VESSELS: Within normal limits.  LYMPH NODES: No lymphadenopathy.  ABDOMINAL WALL: Within normal limits.  BONES: No acute findings. No concerning lytic or sclerotic osseous   lesions. Degenerative findings throughout the lumbar spine and lower   thoracic spine.    IMPRESSION:    Moderate distal small bowel obstruction. A transition point is not   definitely identified. Substantially distended stomach filled with fluid,   gas, and ingested food material. Small sliding-type hiatal hernia   containing fluid, likely reflux.    --- End of Report ---    < end of copied text >          ASSESSMENT:   75 yo male with hx of PAfib s/p ablation, CAD with stent 2009, BPH s/p laser TURP x 2, Prostates artery embolization, HLD, Diverticulitis here with abdominal pain since this AM. CT imaging findings concerning for moderate distal SBO, a transition point is not definitely identified.   On Wegovy x 3 months for Obesity, recent dose increase a month ago.    Otherwise no leukocytosis WBC 7.03, chemistry unremarkable.   Lactate 1.5 WNL  Afebrile, vitals are stable.   Last normal BM was 10:30am this AM.      PLAN:  - Recommends admission to Medicine service  - NPO  - IVF  - NGT for gastric decompression (Substantially distended stomach in CT imaging)   - NGT to LIWS and Monitor NGT output  - Monitor bowel functions  - Pain control prn, avoid narcotics  - GI ppx  - DVT ppx  - Encourage ambulation  - AM labs, replete lytes prn  - Serial abdominal exam  - General Surgery following  - Case and plan discussed with Dr. Alexander    
[facility]    Marv Priscila Ventura NP    [address], [place] [id number] [phone]      Chief Complaint:  Patient is a 74y old  Male who presents with a chief complaint of abdominal pain (06 Mar 2025 07:48)      HPI:  74-year-old male with history of paroxysmal A-fib status post ablation, CAD with stent, on Eliquis, BPH, hyperlipidemia, ADD presents with complaint of abdominal pain today.  Patient states that he started having diffuse abdominal pain 10 AM this morning.  He states that pain is diffuse, more localized to mid abdomen, constant and nonradiating with associated nausea and mild chills. He states abdomen feels slightly distended. He states that he had a normal bowel movement today.  He states that he has history of diverticulitis and also has been on Wegovy for weight loss x 3 months. His dosage was increased 3 weeks ago to 0.5mg. His last dose was on 3/2/25. He denies fever, chest pain, shortness of breath, recent travel, known sick contacts, urinary symptoms, diarrhea/constipation, history of abdominal surgeries, or vomiting    Allergies:  penicillin (Rash)      Medications:  acetaminophen     Tablet .. 650 milliGRAM(s) Oral every 6 hours PRN  aluminum hydroxide/magnesium hydroxide/simethicone Suspension 30 milliLiter(s) Oral every 4 hours PRN  atorvastatin 80 milliGRAM(s) Oral at bedtime  diatrizoate meglumine/diatrizoate sodium. 100 milliLiter(s) Oral once  diphenhydrAMINE Injectable 25 milliGRAM(s) IV Push at bedtime PRN  enoxaparin Injectable 40 milliGRAM(s) SubCutaneous every 24 hours  finasteride 5 milliGRAM(s) Oral daily  fluvoxaMINE 125 milliGRAM(s) Oral daily  fluvoxaMINE 50 milliGRAM(s) Oral <User Schedule>  fluvoxaMINE 100 milliGRAM(s) Oral at bedtime  HYDROmorphone  Injectable 0.5 milliGRAM(s) IV Push every 4 hours PRN  HYDROmorphone  Injectable 0.2 milliGRAM(s) IV Push every 4 hours PRN  melatonin 3 milliGRAM(s) Oral at bedtime PRN  ondansetron Injectable 4 milliGRAM(s) IV Push every 6 hours PRN  pantoprazole  Injectable 40 milliGRAM(s) IV Push daily  sodium chloride 0.9%. 1000 milliLiter(s) IV Continuous <Continuous>  tamsulosin 0.8 milliGRAM(s) Oral at bedtime      PMHX/PSHX:  CAD (coronary artery disease)    BPH (benign prostatic hyperplasia)    Hyperlipemia    Afib    Hyperlipemia    Stented coronary artery    Stented coronary artery        Family history:  No pertinent family history in first degree relatives        Social History:     ROS:     General:  No wt loss, fevers, chills, night sweats, fatigue,   Eyes:  Good vision, no reported pain  ENT:  No sore throat, pain, runny nose, dysphagia  CV:  No pain, palpitations, hypo/hypertension  Resp:  No dyspnea, cough, tachypnea, wheezing  GI:  No pain, No nausea, No vomiting, No diarrhea, No constipation, No weight loss, No fever, No pruritis, No rectal bleeding, No tarry stools, No dysphagia,  :  No pain, bleeding, incontinence, nocturia  Muscle:  No pain, weakness  Neuro:  No weakness, tingling, memory problems  Psych:  No fatigue, insomnia, mood problems, depression  Endocrine:  No polyuria, polydipsia, cold/heat intolerance  Heme:  No petechiae, ecchymosis, easy bruisability  Skin:  No rash, tattoos, scars, edema      PHYSICAL EXAM:   Vital Signs:  Vital Signs Last 24 Hrs  T(C): 36.6 (06 Mar 2025 07:46), Max: 36.7 (05 Mar 2025 14:01)  T(F): 97.9 (06 Mar 2025 07:46), Max: 98.1 (05 Mar 2025 23:25)  HR: 80 (06 Mar 2025 07:46) (77 - 85)  BP: 127/83 (06 Mar 2025 07:46) (122/72 - 130/84)  BP(mean): --  RR: 17 (06 Mar 2025 07:46) (17 - 20)  SpO2: 96% (06 Mar 2025 07:46) (92% - 98%)    Parameters below as of 06 Mar 2025 07:46  Patient On (Oxygen Delivery Method): room air      Daily Height in cm: 180.34 (05 Mar 2025 14:01)    Daily     GENERAL:  Appears stated age, well-groomed, well-nourished, no distress  HEENT: NGT  CHEST:  Full & symmetric excursion, no increased effort, breath sounds clear  HEART:  Regular rhythm, S1, S2, no murmur/rub/S3/S4, no abdominal bruit, no edema  ABDOMEN:  Soft, non-tender, non-distended, normoactive bowel sounds,  no masses ,no hepato-splenomegaly, no signs of chronic liver disease  EXTEREMITIES:  no cyanosis,clubbing or edema  SKIN:  No rash/erythema/ecchymoses/petechiae/wounds/abscess/warm/dry  NEURO:  Alert, oriented, no asterixis, no tremor, no encephalopathy    LABS:                        15.0   5.51  )-----------( 123      ( 06 Mar 2025 07:57 )             42.7     03-06    139  |  103  |  15  ----------------------------<  91  4.2   |  31  |  1.22    Ca    8.7      06 Mar 2025 07:57  Mg     1.7     03-06    TPro  7.3  /  Alb  3.4  /  TBili  1.0  /  DBili  0.2  /  AST  18  /  ALT  25  /  AlkPhos  109  03-06    LIVER FUNCTIONS - ( 06 Mar 2025 07:57 )  Alb: 3.4 g/dL / Pro: 7.3 g/dL / ALK PHOS: 109 U/L / ALT: 25 U/L / AST: 18 U/L / GGT: x           PT/INR - ( 05 Mar 2025 14:35 )   PT: 16.6 sec;   INR: 1.41 ratio         PTT - ( 05 Mar 2025 14:35 )  PTT:36.6 sec  Urinalysis Basic - ( 06 Mar 2025 07:57 )    Color: x / Appearance: x / SG: x / pH: x  Gluc: 91 mg/dL / Ketone: x  / Bili: x / Urobili: x   Blood: x / Protein: x / Nitrite: x   Leuk Esterase: x / RBC: x / WBC x   Sq Epi: x / Non Sq Epi: x / Bacteria: x      Amylase Serum37      Lipase serum20       Ammonia--  Amylase Serum--      Lipase serum29       Ammonia--      Imaging:    < from: CT Abdomen and Pelvis w/ IV Cont (03.05.25 @ 16:04) >    IMPRESSION:    Moderate distal small bowel obstruction. A transition point is not   definitely identified. Substantially distended stomach filled with fluid,   gas, and ingested food material. Small sliding-type hiatal hernia   containing fluid, likely reflux.    --- End of Report ---        < end of copied text >  < from: Xray Chest 1 View- PORTABLE-Urgent (Xray Chest 1 View- PORTABLE-Urgent .) (03.05.25 @ 18:24) >    IMPRESSION: Tip of NG tube likely at GE junction.  Lower chest/upper abdomen radiograph recommended. Mild cardiomegaly.   No radiographic evidence of active chest disease..    --- End of Report ---        < end of copied text >

## 2025-03-07 VITALS
OXYGEN SATURATION: 93 % | HEART RATE: 80 BPM | SYSTOLIC BLOOD PRESSURE: 143 MMHG | TEMPERATURE: 98 F | RESPIRATION RATE: 18 BRPM | DIASTOLIC BLOOD PRESSURE: 93 MMHG

## 2025-03-07 LAB
ANION GAP SERPL CALC-SCNC: 8 MMOL/L — SIGNIFICANT CHANGE UP (ref 5–17)
BUN SERPL-MCNC: 13 MG/DL — SIGNIFICANT CHANGE UP (ref 7–23)
CALCIUM SERPL-MCNC: 8.1 MG/DL — LOW (ref 8.4–10.5)
CHLORIDE SERPL-SCNC: 106 MMOL/L — SIGNIFICANT CHANGE UP (ref 96–108)
CO2 SERPL-SCNC: 29 MMOL/L — SIGNIFICANT CHANGE UP (ref 22–31)
CREAT SERPL-MCNC: 1.02 MG/DL — SIGNIFICANT CHANGE UP (ref 0.5–1.3)
EGFR: 78 ML/MIN/1.73M2 — SIGNIFICANT CHANGE UP
EGFR: 78 ML/MIN/1.73M2 — SIGNIFICANT CHANGE UP
GLUCOSE SERPL-MCNC: 89 MG/DL — SIGNIFICANT CHANGE UP (ref 70–99)
POTASSIUM SERPL-MCNC: 4 MMOL/L — SIGNIFICANT CHANGE UP (ref 3.5–5.3)
POTASSIUM SERPL-SCNC: 4 MMOL/L — SIGNIFICANT CHANGE UP (ref 3.5–5.3)
SODIUM SERPL-SCNC: 143 MMOL/L — SIGNIFICANT CHANGE UP (ref 135–145)

## 2025-03-07 PROCEDURE — 80048 BASIC METABOLIC PNL TOTAL CA: CPT

## 2025-03-07 PROCEDURE — 36415 COLL VENOUS BLD VENIPUNCTURE: CPT

## 2025-03-07 PROCEDURE — 82150 ASSAY OF AMYLASE: CPT

## 2025-03-07 PROCEDURE — 96375 TX/PRO/DX INJ NEW DRUG ADDON: CPT

## 2025-03-07 PROCEDURE — 71045 X-RAY EXAM CHEST 1 VIEW: CPT

## 2025-03-07 PROCEDURE — 96374 THER/PROPH/DIAG INJ IV PUSH: CPT

## 2025-03-07 PROCEDURE — 74177 CT ABD & PELVIS W/CONTRAST: CPT | Mod: MC

## 2025-03-07 PROCEDURE — 85610 PROTHROMBIN TIME: CPT

## 2025-03-07 PROCEDURE — 81001 URINALYSIS AUTO W/SCOPE: CPT

## 2025-03-07 PROCEDURE — 80053 COMPREHEN METABOLIC PANEL: CPT

## 2025-03-07 PROCEDURE — 84484 ASSAY OF TROPONIN QUANT: CPT

## 2025-03-07 PROCEDURE — 85730 THROMBOPLASTIN TIME PARTIAL: CPT

## 2025-03-07 PROCEDURE — 83690 ASSAY OF LIPASE: CPT

## 2025-03-07 PROCEDURE — 83735 ASSAY OF MAGNESIUM: CPT

## 2025-03-07 PROCEDURE — 93005 ELECTROCARDIOGRAM TRACING: CPT

## 2025-03-07 PROCEDURE — 85027 COMPLETE CBC AUTOMATED: CPT

## 2025-03-07 PROCEDURE — 74250 X-RAY XM SM INT 1CNTRST STD: CPT

## 2025-03-07 PROCEDURE — 99232 SBSQ HOSP IP/OBS MODERATE 35: CPT

## 2025-03-07 PROCEDURE — 83605 ASSAY OF LACTIC ACID: CPT

## 2025-03-07 PROCEDURE — 80076 HEPATIC FUNCTION PANEL: CPT

## 2025-03-07 PROCEDURE — 85025 COMPLETE CBC W/AUTO DIFF WBC: CPT

## 2025-03-07 PROCEDURE — 99285 EMERGENCY DEPT VISIT HI MDM: CPT

## 2025-03-07 RX ORDER — ACETAMINOPHEN 500 MG/5ML
2 LIQUID (ML) ORAL
Qty: 0 | Refills: 0 | DISCHARGE
Start: 2025-03-07

## 2025-03-07 RX ORDER — ORAL SEMAGLUTIDE 14 MG/1
0.5 TABLET ORAL
Refills: 0 | DISCHARGE

## 2025-03-07 RX ADMIN — FINASTERIDE 5 MILLIGRAM(S): 1 TABLET, FILM COATED ORAL at 12:50

## 2025-03-07 RX ADMIN — Medication 40 MILLIGRAM(S): at 10:11

## 2025-03-07 RX ADMIN — FLUVOXAMINE MALEATE 125 MILLIGRAM(S): 25 TABLET, FILM COATED ORAL at 08:40

## 2025-03-07 RX ADMIN — FLUVOXAMINE MALEATE 50 MILLIGRAM(S): 25 TABLET, FILM COATED ORAL at 12:50

## 2025-03-07 NOTE — DISCHARGE NOTE NURSING/CASE MANAGEMENT/SOCIAL WORK - PATIENT PORTAL LINK FT
You can access the FollowMyHealth Patient Portal offered by [facility] by registering at the following website: [email or web address]. By joining [facility]’s FollowMyHealth portal, you will also be able to view your health information using other applications (apps) compatible with our system.

## 2025-03-07 NOTE — PROGRESS NOTE ADULT - SUBJECTIVE AND OBJECTIVE BOX
Chief Complaint: Abdominal pain    Interval Events: No events overnight.    Review of Systems:  General: No fevers, chills, weight gain  Skin: No rashes, color changes  Cardiovascular: No chest pain, orthopnea  Respiratory: No shortness of breath, cough  Gastrointestinal: No nausea, abdominal pain  Genitourinary: No incontinence, pain with urination  Musculoskeletal: No pain, swelling, decreased range of motion  Neurological: No headache, weakness  Psychiatric: No depression, anxiety  Endocrine: No weight gain, increased thirst  All other systems are comprehensively negative.    Physical Exam:  Vitals:        Vital Signs Last 24 Hrs  T(C): 36.8 (07 Mar 2025 07:29), Max: 37.1 (06 Mar 2025 23:20)  T(F): 98.3 (07 Mar 2025 07:29), Max: 98.7 (06 Mar 2025 23:20)  HR: 80 (07 Mar 2025 07:29) (80 - 90)  BP: 143/93 (07 Mar 2025 07:29) (118/76 - 143/93)  BP(mean): --  RR: 18 (07 Mar 2025 07:29) (17 - 18)  SpO2: 93% (07 Mar 2025 07:29) (93% - 95%)  Parameters below as of 07 Mar 2025 07:29  Patient On (Oxygen Delivery Method): room air  General: NAD  HEENT: MMM  Neck: No JVD, no carotid bruit  Lungs: CTAB  CV: RRR, nl S1/S2, no M/R/G  Abdomen: S/NT/ND, +BS  Extremities: No LE edema, no cyanosis  Neuro: AAOx3, non-focal  Skin: No rash    Labs:                        15.0   5.51  )-----------( 123      ( 06 Mar 2025 07:57 )             42.7     03-07    143  |  106  |  13  ----------------------------<  89  4.0   |  29  |  1.02    Ca    8.1[L]      07 Mar 2025 06:30  Mg     1.7     03-06    TPro  7.3  /  Alb  3.4  /  TBili  1.0  /  DBili  0.2  /  AST  18  /  ALT  25  /  AlkPhos  109  03-06        PT/INR - ( 05 Mar 2025 14:35 )   PT: 16.6 sec;   INR: 1.41 ratio         PTT - ( 05 Mar 2025 14:35 )  PTT:36.6 sec    ECG/Telemetry: Sinus rhythm
ISAURA BEARDEN  MRN-[id number] 74y Male    GENERAL SURGERY/ DR. CIERRA JOY     NO FEVER, CHILLS  NO N/V, TOLERATING CLEAR LIQUID DIET  + FLATUS, + BM X2   NO ABDOMINAL PAIN/ CRAMPS  VOIDING    MEDICATIONS  (STANDING):  atorvastatin 80 milliGRAM(s) Oral at bedtime  diatrizoate meglumine/diatrizoate sodium. 100 milliLiter(s) Oral once  enoxaparin Injectable 40 milliGRAM(s) SubCutaneous every 24 hours  finasteride 5 milliGRAM(s) Oral daily  fluvoxaMINE 125 milliGRAM(s) Oral daily  fluvoxaMINE 50 milliGRAM(s) Oral <User Schedule>  fluvoxaMINE 100 milliGRAM(s) Oral at bedtime  pantoprazole  Injectable 40 milliGRAM(s) IV Push daily  tamsulosin 0.8 milliGRAM(s) Oral at bedtime    MEDICATIONS  (PRN):  acetaminophen     Tablet .. 650 milliGRAM(s) Oral every 6 hours PRN Temp greater or equal to 38C (100.4F), Mild Pain (1 - 3)  aluminum hydroxide/magnesium hydroxide/simethicone Suspension 30 milliLiter(s) Oral every 4 hours PRN Dyspepsia  diphenhydrAMINE Injectable 25 milliGRAM(s) IV Push at bedtime PRN Insomnia  HYDROmorphone  Injectable 0.5 milliGRAM(s) IV Push every 4 hours PRN Severe Pain (7 - 10)  HYDROmorphone  Injectable 0.2 milliGRAM(s) IV Push every 4 hours PRN Moderate Pain (4 - 6)  melatonin 3 milliGRAM(s) Oral at bedtime PRN Insomnia  ondansetron Injectable 4 milliGRAM(s) IV Push every 6 hours PRN Nausea and/or Vomiting     Vital Signs Last 24 Hrs  T(C): 36.8 (07 Mar 2025 07:29), Max: 37.1 (06 Mar 2025 23:20)  T(F): 98.3 (07 Mar 2025 07:29), Max: 98.7 (06 Mar 2025 23:20)  HR: 80 (07 Mar 2025 07:29) (80 - 90)  BP: 143/93 (07 Mar 2025 07:29) (118/76 - 143/93)  RR: 18 (07 Mar 2025 07:29) (17 - 18)  SpO2: 93% (07 Mar 2025 07:29) (93% - 96%)    Parameters below as of 07 Mar 2025 07:29  Patient On (Oxygen Delivery Method): room air    03-06-25 @ 07:01  -  03-07-25 @ 07:00  --------------------------------------------------------  IN: 0 mL / OUT: 300 mL / NET: -300 mL     LUNGS: CLEAR TO AUSCULTATION , NO W/R/R  ABDOMEN: PROTUBERANT, + BS, SOFTLY DISTEND , NO PALPABLE MASS, NON TENDER TO PALPATION, NO PERITONEAL SIGN   EXTREMITY: NO EDEMA, NO CALF TENDERNESS                         03-07    143  |  106  |  13  ----------------------------<  89  4.0   |  29  |  1.02    Ca    8.1[L]      07 Mar 2025 06:30  Mg     1.7     03-06    TPro  7.3  /  Alb  3.4  /  TBili  1.0  /  DBili  0.2  /  AST  18  /  ALT  25  /  AlkPhos  109  03-06    ASSESSMENT &  PLAN:      ABDOMINAL PAIN WITH DISTENTION- RESOLVED, FULL GI FUNCTION     H/O AFIB ON ELIQUIS AT HOME   H/O CAD WITH STENT, HYPERLIPIDEMIA, BPH    ADVANCE TO SOFT DIET, OBSERVE FOR DIET TOLERANCE    OOB, AMBULATE  MEDICAL MANAGEMENT AS PER PRIMARY TEAM  MAY D/C HOME IF TOLERATES DIET FROM SURGICAL STANDPOINT   SURGICAL TEAM WILL FOLLOW UP     CASE DISCUSSED WITH DR. JOY                      
Date of Service 03-06-25 @ 14:41    Patient is a 74y old  Male who presents with a chief complaint of abdominal pain (06 Mar 2025 09:10)      INTERVAL /OVERNIGHT EVENTS: pain improved    MEDICATIONS  (STANDING):  atorvastatin 80 milliGRAM(s) Oral at bedtime  diatrizoate meglumine/diatrizoate sodium. 100 milliLiter(s) Oral once  enoxaparin Injectable 40 milliGRAM(s) SubCutaneous every 24 hours  finasteride 5 milliGRAM(s) Oral daily  fluvoxaMINE 125 milliGRAM(s) Oral daily  fluvoxaMINE 50 milliGRAM(s) Oral <User Schedule>  fluvoxaMINE 100 milliGRAM(s) Oral at bedtime  pantoprazole  Injectable 40 milliGRAM(s) IV Push daily  sodium chloride 0.9%. 1000 milliLiter(s) (100 mL/Hr) IV Continuous <Continuous>  tamsulosin 0.8 milliGRAM(s) Oral at bedtime    MEDICATIONS  (PRN):  acetaminophen     Tablet .. 650 milliGRAM(s) Oral every 6 hours PRN Temp greater or equal to 38C (100.4F), Mild Pain (1 - 3)  aluminum hydroxide/magnesium hydroxide/simethicone Suspension 30 milliLiter(s) Oral every 4 hours PRN Dyspepsia  diphenhydrAMINE Injectable 25 milliGRAM(s) IV Push at bedtime PRN Insomnia  HYDROmorphone  Injectable 0.5 milliGRAM(s) IV Push every 4 hours PRN Severe Pain (7 - 10)  HYDROmorphone  Injectable 0.2 milliGRAM(s) IV Push every 4 hours PRN Moderate Pain (4 - 6)  melatonin 3 milliGRAM(s) Oral at bedtime PRN Insomnia  ondansetron Injectable 4 milliGRAM(s) IV Push every 6 hours PRN Nausea and/or Vomiting      Allergies    penicillin (Rash)    Intolerances        REVIEW OF SYSTEMS:  CONSTITUTIONAL: No fever, weight loss, or fatigue  EYES: No eye pain, visual disturbances, or discharge  ENMT:  No difficulty hearing, tinnitus, vertigo; No sinus or throat pain  NECK: No pain or stiffness  RESPIRATORY: No cough, wheezing, chills or hemoptysis; No shortness of breath  CARDIOVASCULAR: No chest pain, palpitations, dizziness, or leg swelling  GASTROINTESTINAL: No abdominal or epigastric pain. No nausea, vomiting, or hematemesis; No diarrhea or constipation. No melena or hematochezia.  GENITOURINARY: No dysuria, frequency, hematuria, or incontinence  NEUROLOGICAL: No headaches, memory loss, loss of strength, numbness, or tremors  SKIN: No itching, burning, rashes, or lesions   LYMPH NODES: No enlarged glands  ENDOCRINE: No heat or cold intolerance; No hair loss; No polydipsia or polyuria  MUSCULOSKELETAL: No joint pain or swelling; No muscle, back, or extremity pain  PSYCHIATRIC: No depression, anxiety, mood swings, or difficulty sleeping  HEME/LYMPH: No easy bruising, or bleeding gums  ALLERGY AND IMMUNOLOGIC: No hives or eczema    Vital Signs Last 24 Hrs  T(C): 36.6 (06 Mar 2025 07:46), Max: 36.7 (05 Mar 2025 23:25)  T(F): 97.9 (06 Mar 2025 07:46), Max: 98.1 (05 Mar 2025 23:25)  HR: 80 (06 Mar 2025 07:46) (77 - 84)  BP: 127/83 (06 Mar 2025 07:46) (122/72 - 129/78)  BP(mean): --  RR: 17 (06 Mar 2025 07:46) (17 - 18)  SpO2: 96% (06 Mar 2025 07:46) (92% - 97%)    Parameters below as of 06 Mar 2025 07:46  Patient On (Oxygen Delivery Method): room air        PHYSICAL EXAM:  GENERAL: NAD, well-groomed, well-developed  HEAD:  Atraumatic, Normocephalic  EYES: EOMI, PERRLA, conjunctiva and sclera clear  ENMT: No tonsillar erythema, exudates, or enlargement; Moist mucous membranes, Good dentition, No lesions  NECK: Supple, No JVD, Normal thyroid  NERVOUS SYSTEM:  Alert & Oriented X3, Good concentration; Motor Strength 5/5 B/L upper and lower extremities; DTRs 2+ intact and symmetric  CHEST/LUNG: Clear to auscultation bilaterally; No rales, rhonchi, wheezing, or rubs  HEART: Regular rate and rhythm; No murmurs, rubs, or gallops  ABDOMEN: Soft, Nontender, Nondistended; Bowel sounds present, NGT in place  EXTREMITIES:  2+ Peripheral Pulses, No clubbing, cyanosis, or edema  LYMPH: No lymphadenopathy noted  SKIN: No rashes or lesions    LABS:                        15.0   5.51  )-----------( 123      ( 06 Mar 2025 07:57 )             42.7     06 Mar 2025 07:57    139    |  103    |  15     ----------------------------<  91     4.2     |  31     |  1.22     Ca    8.7        06 Mar 2025 07:57  Mg     1.7       06 Mar 2025 07:57    TPro  7.3    /  Alb  3.4    /  TBili  1.0    /  DBili  0.2    /  AST  18     /  ALT  25     /  AlkPhos  109    06 Mar 2025 07:57    PT/INR - ( 05 Mar 2025 14:35 )   PT: 16.6 sec;   INR: 1.41 ratio         PTT - ( 05 Mar 2025 14:35 )  PTT:36.6 sec  Urinalysis Basic - ( 06 Mar 2025 07:57 )    Color: x / Appearance: x / SG: x / pH: x  Gluc: 91 mg/dL / Ketone: x  / Bili: x / Urobili: x   Blood: x / Protein: x / Nitrite: x   Leuk Esterase: x / RBC: x / WBC x   Sq Epi: x / Non Sq Epi: x / Bacteria: x      CAPILLARY BLOOD GLUCOSE          RADIOLOGY & ADDITIONAL TESTS:    Notes Reviewed:  [x ] YES  [ ] NO    Care Discussed with Consultants/Other Providers [x ] YES  [ ] NO
ISAURA BEARDEN  MRN-[id number] 74y Male    GENERAL SURGERY/ DR. CIERRA JOY     NO FEVER, CHILLS  NO N/V, NGT IN PLACE  NO FLATUS BM, LAST BM AROUND 10: 30 AM YESTERDAY MORNING  NO ABDOMINAL PAIN, LESS DISTENDED   VOIDING   AMBULATING     MEDICATIONS  (STANDING):  atorvastatin 80 milliGRAM(s) Oral at bedtime  diatrizoate meglumine/diatrizoate sodium. 100 milliLiter(s) Oral once  enoxaparin Injectable 40 milliGRAM(s) SubCutaneous every 24 hours  finasteride 5 milliGRAM(s) Oral daily  fluvoxaMINE 125 milliGRAM(s) Oral daily  fluvoxaMINE 50 milliGRAM(s) Oral <User Schedule>  fluvoxaMINE 100 milliGRAM(s) Oral at bedtime  pantoprazole  Injectable 40 milliGRAM(s) IV Push daily  sodium chloride 0.9%. 1000 milliLiter(s) (125 mL/Hr) IV Continuous <Continuous>  tamsulosin 0.8 milliGRAM(s) Oral at bedtime    MEDICATIONS  (PRN):  acetaminophen     Tablet .. 650 milliGRAM(s) Oral every 6 hours PRN Temp greater or equal to 38C (100.4F), Mild Pain (1 - 3)  aluminum hydroxide/magnesium hydroxide/simethicone Suspension 30 milliLiter(s) Oral every 4 hours PRN Dyspepsia  diphenhydrAMINE Injectable 25 milliGRAM(s) IV Push at bedtime PRN Insomnia  HYDROmorphone  Injectable 0.5 milliGRAM(s) IV Push every 4 hours PRN Severe Pain (7 - 10)  HYDROmorphone  Injectable 0.2 milliGRAM(s) IV Push every 4 hours PRN Moderate Pain (4 - 6)  melatonin 3 milliGRAM(s) Oral at bedtime PRN Insomnia  ondansetron Injectable 4 milliGRAM(s) IV Push every 6 hours PRN Nausea and/or Vomiting     Vital Signs Last 24 Hrs  T(C): 36.6 (06 Mar 2025 07:46), Max: 36.7 (05 Mar 2025 14:01)  T(F): 97.9 (06 Mar 2025 07:46), Max: 98.1 (05 Mar 2025 23:25)  HR: 80 (06 Mar 2025 07:46) (77 - 85)  BP: 127/83 (06 Mar 2025 07:46) (122/72 - 130/84)  RR: 17 (06 Mar 2025 07:46) (17 - 20)  SpO2: 96% (06 Mar 2025 07:46) (92% - 98%)    Parameters below as of 06 Mar 2025 07:46  Patient On (Oxygen Delivery Method): room air    03-05-25 @ 07:01  -  03-06-25 @ 07:00  --------------------------------------------------------  IN: 1375 mL / OUT: 400 mL / NET: 975 mL      LUNGS: CLEAR TO AUSCULTATION , NO W/R/R  ABDOMEN: PROTUBERANT, + BS, SOFTLY DISTEND , NO PALPABLE MASS, NON TENDER TO PALPATION, NO PERITONEAL SIGN   EXTREMITY: NO EDEMA, NO CALF TENDERNESS                             15.4   7.03  )-----------( 128      ( 05 Mar 2025 14:35 )             43.3      03-05    139  |  102  |  17  ----------------------------<  126[H]  4.0   |  29  |  1.17    Ca    9.6      05 Mar 2025 14:35    TPro  8.3  /  Alb  4.1  /  TBili  1.0  /  DBili  x   /  AST  20  /  ALT  30  /  AlkPhos  109  03-05                          ASSESSMENT &  PLAN:      ABDOMINAL PAIN WITH DISTENTION- HAS RESOLVED, SMALL BOWEL OBSTRUCTION ON CT    H/O AFIB ON ELIQUIS AT HOME   H/O CAD WITH STENT, HYPERLIPIDEMIA, BPH    NPO  MAINTAIN NGT   SMALL BOWEL SERIES WITH GASTROGRAFIN PROTOCOL , SPOKE AND ARRANGED WITH RADIOLOGY,  WILL FOLLOW-UP   F/U AM LABS   OOB, AMBULATE  MEDICAL MANAGEMENT AS PER PRIMARY TEAM  SURGICAL TEAM WILL FOLLOW UP     PLAN DISCUSSED WITH PATIENT, NURSING, RADIOLOGY AND PATIENT    CASE DISCUSSED WITH DR. JOY    
[facility]  Reginaldo Mei PA-C  [address] [place] [address]  [phone]      INTERVAL HPI/OVERNIGHT EVENTS: no acute events, Xray with partial SBO, tolerating diet passing flatus     MEDICATIONS  (STANDING):  atorvastatin 80 milliGRAM(s) Oral at bedtime  diatrizoate meglumine/diatrizoate sodium. 100 milliLiter(s) Oral once  enoxaparin Injectable 40 milliGRAM(s) SubCutaneous every 24 hours  finasteride 5 milliGRAM(s) Oral daily  fluvoxaMINE 125 milliGRAM(s) Oral daily  fluvoxaMINE 50 milliGRAM(s) Oral <User Schedule>  fluvoxaMINE 100 milliGRAM(s) Oral at bedtime  pantoprazole  Injectable 40 milliGRAM(s) IV Push daily  tamsulosin 0.8 milliGRAM(s) Oral at bedtime    MEDICATIONS  (PRN):  acetaminophen     Tablet .. 650 milliGRAM(s) Oral every 6 hours PRN Temp greater or equal to 38C (100.4F), Mild Pain (1 - 3)  aluminum hydroxide/magnesium hydroxide/simethicone Suspension 30 milliLiter(s) Oral every 4 hours PRN Dyspepsia  diphenhydrAMINE Injectable 25 milliGRAM(s) IV Push at bedtime PRN Insomnia  HYDROmorphone  Injectable 0.5 milliGRAM(s) IV Push every 4 hours PRN Severe Pain (7 - 10)  HYDROmorphone  Injectable 0.2 milliGRAM(s) IV Push every 4 hours PRN Moderate Pain (4 - 6)  melatonin 3 milliGRAM(s) Oral at bedtime PRN Insomnia  ondansetron Injectable 4 milliGRAM(s) IV Push every 6 hours PRN Nausea and/or Vomiting      Allergies    penicillin (Rash)    Intolerances        ROS:   General:  No  fevers, chills, night sweats, fatigue,   Eyes:  Good vision, no reported pain  ENT:  No sore throat, pain, runny nose, dysphagia  CV:  No pain, palpitations, hypo/hypertension  Resp:  No dyspnea, cough, tachypnea, wheezing  GI:  No pain, No nausea, No vomiting, No diarrhea, No constipation, No weight loss, No fever, No pruritis, No rectal bleeding, No tarry stools, No dysphagia,  :  No pain, bleeding, incontinence, nocturia  Muscle:  No pain, weakness  Neuro:  No weakness, tingling, memory problems  Psych:  No fatigue, insomnia, mood problems, depression  Endocrine:  No polyuria, polydipsia, cold/heat intolerance  Heme:  No petechiae, ecchymosis, easy bruisability  Skin:  No rash, tattoos, scars, edema      PHYSICAL EXAM:   Vital Signs:  Vital Signs Last 24 Hrs  T(C): 36.8 (07 Mar 2025 07:29), Max: 37.1 (06 Mar 2025 23:20)  T(F): 98.3 (07 Mar 2025 07:29), Max: 98.7 (06 Mar 2025 23:20)  HR: 80 (07 Mar 2025 07:29) (80 - 90)  BP: 143/93 (07 Mar 2025 07:29) (118/76 - 143/93)  BP(mean): --  RR: 18 (07 Mar 2025 07:29) (17 - 18)  SpO2: 93% (07 Mar 2025 07:29) (93% - 95%)    Parameters below as of 07 Mar 2025 07:29  Patient On (Oxygen Delivery Method): room air      Daily     Daily     GENERAL:  Appears stated age,   HEENT:  NC/AT,    LUNGS: CLEAR TO AUSCULTATION , NO W/R/R  ABDOMEN: PROTUBERANT, + BS, SOFTLY DISTEND , NO PALPABLE MASS, NON TENDER TO PALPATION, NO PERITONEAL SIGN   EXTREMITY: NO EDEMA, NO CALF TENDERNESS                         03-07    LABS:                        15.0   5.51  )-----------( 123      ( 06 Mar 2025 07:57 )             42.7     03-07    143  |  106  |  13  ----------------------------<  89  4.0   |  29  |  1.02    Ca    8.1[L]      07 Mar 2025 06:30  Mg     1.7     03-06    TPro  7.3  /  Alb  3.4  /  TBili  1.0  /  DBili  0.2  /  AST  18  /  ALT  25  /  AlkPhos  109  03-06    PT/INR - ( 05 Mar 2025 14:35 )   PT: 16.6 sec;   INR: 1.41 ratio         PTT - ( 05 Mar 2025 14:35 )  PTT:36.6 sec  Urinalysis Basic - ( 07 Mar 2025 06:30 )    Color: x / Appearance: x / SG: x / pH: x  Gluc: 89 mg/dL / Ketone: x  / Bili: x / Urobili: x   Blood: x / Protein: x / Nitrite: x   Leuk Esterase: x / RBC: x / WBC x   Sq Epi: x / Non Sq Epi: x / Bacteria: x        RADIOLOGY & ADDITIONAL TESTS:

## 2025-03-07 NOTE — DISCHARGE NOTE NURSING/CASE MANAGEMENT/SOCIAL WORK - FINANCIAL ASSISTANCE
[facility] provides services at a reduced cost to those who are determined to be eligible through [facility]’s financial assistance program. Information regarding [facility]’s financial assistance program can be found by going to [email or web address] or by calling [phone].

## 2025-03-07 NOTE — DISCHARGE NOTE PROVIDER - CARE PROVIDER_API CALL
Rick Alexander  Surgery  [address] [place] [id number]  Phone: [phone]  Fax: [phone]  Follow Up Time:     Víctor Kim  Internal Medicine  [address] [place] [id number]  Phone: [phone]  Fax: [phone]  Follow Up Time:

## 2025-03-07 NOTE — PROGRESS NOTE ADULT - ASSESSMENT
The patient is a 74 year old male with a history of HL, CAD s/p PCI, atrial fibrillation s/p ablation, atrial tachycardia s/p ablation, BPH, ADD who presented with abdominal pain.    Plan:  - ECG with sinus rhythm and no evidence of ischemia/infarction  - Cardiac enzymes negative  - CT A/P with distal SBO  - CXR with no active chest disease  - Resume apixaban if no plans for surgery  - Continue atorvastatin 80 mg daily  - Surgery follow-up
SBO without transition point  Afib on Eliquis    Recommendations:  - PPI 40mg IV QD  - Avoid opiates please   - Gastrograffin challenge reviewed  - f/u Surgery recs  - Serial abdominal exams  - IVF  - d/c planning if tolerating diet. May f/u with me on discharge  - Will follow with you    Josr Lezama M.D.  [facility]  [phone]  
Surg ATt.  Pt. was seen and examined. Currently undergoing gastrografin challenge . Will follow.
Surg. Att.  Pt. discussed with surgical PA. GI function is present- gas and bowel movements. AXR shows contrast in colon rectum but last night films still show dilated bowel loops.  Will repeat AXR today.

## 2025-03-07 NOTE — GOALS OF CARE CONVERSATION - ADVANCED CARE PLANNING - CONVERSATION DETAILS

## 2025-03-07 NOTE — DISCHARGE NOTE PROVIDER - CARE PROVIDERS DIRECT ADDRESSES
[email or web address]

## 2025-03-07 NOTE — CASE MANAGEMENT PROGRESS NOTE - NSCMPROGRESSNOTE_GEN_ALL_CORE
Patient discussed in rounds today. Patient is anticipating dc home today. No needs identified for homecare at this time. Follow up apt with DR Gupta, Monday March 10th 2025 1:15pm [address] [facility] [address], [phone]. IMM discussed and signed with patient and he is agreeable with discharge. Patient will arrange his own transport home today.

## 2025-03-07 NOTE — DISCHARGE NOTE PROVIDER - NSDCCPCAREPLAN_GEN_ALL_CORE_FT
PRINCIPAL DISCHARGE DIAGNOSIS  Diagnosis: Small bowel obstruction  Assessment and Plan of Treatment: follow up with Surgeon Dr. Alexander

## 2025-03-07 NOTE — DISCHARGE NOTE NURSING/CASE MANAGEMENT/SOCIAL WORK - NSDCFUADDAPPT_GEN_ALL_CORE_FT
Follow up apt with DR Gupta   Monday March 10th 2025 1:15pm  [address]   [facility] [address]  [phone]

## 2025-03-07 NOTE — DISCHARGE NOTE PROVIDER - NSDCMRMEDTOKEN_GEN_ALL_CORE_FT
acetaminophen 325 mg oral tablet: 2 tab(s) orally every 6 hours As needed Temp greater or equal to 38C (100.4F), Mild Pain (1 - 3)  Eliquis 5 mg oral tablet: 1 tab(s) orally 2 times a day  Flomax 0.4 mg oral capsule: 2 cap(s) orally once a day (at bedtime)  fluvoxaMINE 100 mg oral tablet: 1 tab(s) orally 2 times a day in the morning and in the evening  fluvoxaMINE 25 mg oral tablet: 1 tab(s) orally once a day in the morning (100mg + 25mg = total 125mg in the morning)  fluvoxaMINE 50 mg oral tablet: 1 tab(s) orally once a day in the afternoon  Lipitor 80 mg oral tablet: 1 tab(s) orally once a day  Proscar 5 mg oral tablet: 1 tab(s) orally once a day (at bedtime)

## 2025-03-07 NOTE — DISCHARGE NOTE PROVIDER - HOSPITAL COURSE
Visit Details:    - Summary: Magen Terrell, a 74-year-old white male with multiple medical problems, presented to the emergency room with abdominal pain, nausea, and vomiting, thought to be due to a possible govi. He was diagnosed with a bowel obstruction, based on abdominal x-ray and CAT scan results, and was advised conservative management initially. His condition improved gradually with treatment.    - Diagnosis: Bowel obstruction, presumed due to  possible 'wegovi'.     Medications:    - Protonics for GI protection were administered during the hospital stay. The patient's anticoagulation medication for atrial fibrillation was held and then resumed on discharge.     Treatment Plan:    - Initial treatment consisted of surgical management, placement of an NG tube, and making the patient NPO. The patient was eventually transitioned to a regular diet and discharged in medically stable condition.     Follow Up Instructions:    - The patient was instructed to follow up with surgeon, Dr. Owusu. He was also advised to return to the emergency room if he experiences pain, nausea, vomiting, or constipation unresolved by laxatives.     Test Results:    - Abdominal x-ray and CAT scan revealed a bowel obstruction. A subsequent small bowel series still showed a partial small bowel obstruction.     Preventive Care Recommendations:    - Patient is advised to contact the emergency room immediately upon any return of abdominal pain, nausea, vomiting or constipation unresolved by laxatives.     Additional Notes:    - The patient's anticoagulation medication for atrial fibrillation was briefly interrupted during the hospital stay due to his medical condition, but was resumed upon discharge.     Appointment Information:    - Date:     - Time:     - Location:     - Provider: Dr. Alexander    - Reason:

## 2025-05-30 ENCOUNTER — LABORATORY RESULT (OUTPATIENT)
Age: 75
End: 2025-05-30

## 2025-05-30 ENCOUNTER — APPOINTMENT (OUTPATIENT)
Dept: UROLOGY | Facility: CLINIC | Age: 75
End: 2025-05-30
Payer: MEDICARE

## 2025-05-30 DIAGNOSIS — R31.0 GROSS HEMATURIA: ICD-10-CM

## 2025-05-30 DIAGNOSIS — N40.0 BENIGN PROSTATIC HYPERPLASIA WITHOUT LOWER URINARY TRACT SYMPMS: ICD-10-CM

## 2025-05-30 PROCEDURE — 51741 ELECTRO-UROFLOWMETRY FIRST: CPT

## 2025-05-30 PROCEDURE — 99214 OFFICE O/P EST MOD 30 MIN: CPT

## 2025-05-30 PROCEDURE — 51798 US URINE CAPACITY MEASURE: CPT

## 2025-06-02 LAB
APPEARANCE: CLEAR
BILIRUBIN URINE: NEGATIVE
BLOOD URINE: ABNORMAL
COLOR: YELLOW
GLUCOSE QUALITATIVE U: NEGATIVE MG/DL
KETONES URINE: NEGATIVE MG/DL
LEUKOCYTE ESTERASE URINE: ABNORMAL
NITRITE URINE: NEGATIVE
PH URINE: 5.5
PROTEIN URINE: NEGATIVE MG/DL
SPECIFIC GRAVITY URINE: 1.01
URINE CYTOLOGY: NORMAL
UROBILINOGEN URINE: 0.2 MG/DL

## 2025-06-04 ENCOUNTER — APPOINTMENT (OUTPATIENT)
Dept: UROLOGY | Facility: CLINIC | Age: 75
End: 2025-06-04
Payer: MEDICARE

## 2025-06-04 VITALS
OXYGEN SATURATION: 99 % | HEART RATE: 85 BPM | DIASTOLIC BLOOD PRESSURE: 69 MMHG | BODY MASS INDEX: 37.1 KG/M2 | WEIGHT: 265 LBS | HEIGHT: 71 IN | SYSTOLIC BLOOD PRESSURE: 108 MMHG

## 2025-06-04 PROCEDURE — 51741 ELECTRO-UROFLOWMETRY FIRST: CPT

## 2025-06-04 PROCEDURE — 52000 CYSTOURETHROSCOPY: CPT

## 2025-06-04 PROCEDURE — 99214 OFFICE O/P EST MOD 30 MIN: CPT | Mod: 25

## 2025-06-04 PROCEDURE — 51798 US URINE CAPACITY MEASURE: CPT

## 2025-06-04 RX ORDER — SULFAMETHOXAZOLE AND TRIMETHOPRIM 800; 160 MG/1; MG/1
800-160 TABLET ORAL
Qty: 1 | Refills: 0 | Status: ACTIVE | OUTPATIENT
Start: 2025-06-04

## 2025-06-05 ENCOUNTER — APPOINTMENT (OUTPATIENT)
Dept: CT IMAGING | Facility: CLINIC | Age: 75
End: 2025-06-05